# Patient Record
Sex: FEMALE | Race: WHITE | NOT HISPANIC OR LATINO | Employment: OTHER | URBAN - METROPOLITAN AREA
[De-identification: names, ages, dates, MRNs, and addresses within clinical notes are randomized per-mention and may not be internally consistent; named-entity substitution may affect disease eponyms.]

---

## 2018-07-18 ENCOUNTER — APPOINTMENT (OUTPATIENT)
Dept: RADIOLOGY | Facility: MEDICAL CENTER | Age: 83
DRG: 038 | End: 2018-07-18
Attending: HOSPITALIST
Payer: MEDICARE

## 2018-07-18 ENCOUNTER — APPOINTMENT (OUTPATIENT)
Dept: RADIOLOGY | Facility: MEDICAL CENTER | Age: 83
DRG: 038 | End: 2018-07-18
Attending: EMERGENCY MEDICINE
Payer: MEDICARE

## 2018-07-18 ENCOUNTER — HOSPITAL ENCOUNTER (INPATIENT)
Facility: MEDICAL CENTER | Age: 83
LOS: 8 days | DRG: 038 | End: 2018-07-26
Attending: EMERGENCY MEDICINE | Admitting: HOSPITALIST
Payer: MEDICARE

## 2018-07-18 DIAGNOSIS — I63.9 CEREBROVASCULAR ACCIDENT (CVA), UNSPECIFIED MECHANISM (HCC): ICD-10-CM

## 2018-07-18 PROBLEM — K21.9 GASTROESOPHAGEAL REFLUX DISEASE: Status: ACTIVE | Noted: 2018-07-18

## 2018-07-18 PROBLEM — R79.89 RENAL AZOTEMIA: Status: ACTIVE | Noted: 2018-07-18

## 2018-07-18 PROBLEM — I10 HYPERTENSION: Status: ACTIVE | Noted: 2018-07-18

## 2018-07-18 PROBLEM — R73.9 HYPERGLYCEMIA: Status: ACTIVE | Noted: 2018-07-18

## 2018-07-18 PROBLEM — E83.42 HYPOMAGNESEMIA: Status: ACTIVE | Noted: 2018-07-18

## 2018-07-18 PROBLEM — R32 URINARY INCONTINENCE: Status: ACTIVE | Noted: 2018-07-18

## 2018-07-18 LAB
ABO GROUP BLD: NORMAL
ALBUMIN SERPL BCP-MCNC: 4.2 G/DL (ref 3.2–4.9)
ALBUMIN/GLOB SERPL: 1.4 G/DL
ALP SERPL-CCNC: 44 U/L (ref 30–99)
ALT SERPL-CCNC: 13 U/L (ref 2–50)
ANION GAP SERPL CALC-SCNC: 11 MMOL/L (ref 0–11.9)
APTT PPP: 24.3 SEC (ref 24.7–36)
AST SERPL-CCNC: 22 U/L (ref 12–45)
BASOPHILS # BLD AUTO: 0.7 % (ref 0–1.8)
BASOPHILS # BLD: 0.06 K/UL (ref 0–0.12)
BILIRUB SERPL-MCNC: 0.6 MG/DL (ref 0.1–1.5)
BLD GP AB SCN SERPL QL: NORMAL
BUN SERPL-MCNC: 21 MG/DL (ref 8–22)
CALCIUM SERPL-MCNC: 10.1 MG/DL (ref 8.5–10.5)
CHLORIDE SERPL-SCNC: 98 MMOL/L (ref 96–112)
CO2 SERPL-SCNC: 27 MMOL/L (ref 20–33)
CREAT SERPL-MCNC: 1.07 MG/DL (ref 0.5–1.4)
EOSINOPHIL # BLD AUTO: 0.06 K/UL (ref 0–0.51)
EOSINOPHIL NFR BLD: 0.7 % (ref 0–6.9)
ERYTHROCYTE [DISTWIDTH] IN BLOOD BY AUTOMATED COUNT: 47.4 FL (ref 35.9–50)
EST. AVERAGE GLUCOSE BLD GHB EST-MCNC: 120 MG/DL
GLOBULIN SER CALC-MCNC: 3 G/DL (ref 1.9–3.5)
GLUCOSE BLD-MCNC: 187 MG/DL (ref 65–99)
GLUCOSE SERPL-MCNC: 177 MG/DL (ref 65–99)
HBA1C MFR BLD: 5.8 % (ref 0–5.6)
HCT VFR BLD AUTO: 36.3 % (ref 37–47)
HGB BLD-MCNC: 12.5 G/DL (ref 12–16)
IMM GRANULOCYTES # BLD AUTO: 0.06 K/UL (ref 0–0.11)
IMM GRANULOCYTES NFR BLD AUTO: 0.7 % (ref 0–0.9)
INR PPP: 1.06 (ref 0.87–1.13)
LYMPHOCYTES # BLD AUTO: 0.92 K/UL (ref 1–4.8)
LYMPHOCYTES NFR BLD: 10.5 % (ref 22–41)
MAGNESIUM SERPL-MCNC: 1.3 MG/DL (ref 1.5–2.5)
MCH RBC QN AUTO: 31.5 PG (ref 27–33)
MCHC RBC AUTO-ENTMCNC: 34.4 G/DL (ref 33.6–35)
MCV RBC AUTO: 91.4 FL (ref 81.4–97.8)
MONOCYTES # BLD AUTO: 0.36 K/UL (ref 0–0.85)
MONOCYTES NFR BLD AUTO: 4.1 % (ref 0–13.4)
NEUTROPHILS # BLD AUTO: 7.34 K/UL (ref 2–7.15)
NEUTROPHILS NFR BLD: 83.3 % (ref 44–72)
NRBC # BLD AUTO: 0 K/UL
NRBC BLD-RTO: 0 /100 WBC
PLATELET # BLD AUTO: 280 K/UL (ref 164–446)
PMV BLD AUTO: 9.7 FL (ref 9–12.9)
POTASSIUM SERPL-SCNC: 4.2 MMOL/L (ref 3.6–5.5)
PROT SERPL-MCNC: 7.2 G/DL (ref 6–8.2)
PROTHROMBIN TIME: 13.5 SEC (ref 12–14.6)
RBC # BLD AUTO: 3.97 M/UL (ref 4.2–5.4)
RH BLD: NORMAL
SODIUM SERPL-SCNC: 136 MMOL/L (ref 135–145)
TROPONIN I SERPL-MCNC: <0.01 NG/ML (ref 0–0.04)
TSH SERPL DL<=0.005 MIU/L-ACNC: 3.17 UIU/ML (ref 0.38–5.33)
WBC # BLD AUTO: 8.8 K/UL (ref 4.8–10.8)

## 2018-07-18 PROCEDURE — 700111 HCHG RX REV CODE 636 W/ 250 OVERRIDE (IP): Performed by: HOSPITALIST

## 2018-07-18 PROCEDURE — 0042T CT-CEREBRAL PERFUSION ANALYSIS: CPT

## 2018-07-18 PROCEDURE — 700101 HCHG RX REV CODE 250: Performed by: HOSPITALIST

## 2018-07-18 PROCEDURE — 700117 HCHG RX CONTRAST REV CODE 255: Performed by: EMERGENCY MEDICINE

## 2018-07-18 PROCEDURE — 70551 MRI BRAIN STEM W/O DYE: CPT

## 2018-07-18 PROCEDURE — 700105 HCHG RX REV CODE 258: Performed by: HOSPITALIST

## 2018-07-18 PROCEDURE — 82962 GLUCOSE BLOOD TEST: CPT

## 2018-07-18 PROCEDURE — 80053 COMPREHEN METABOLIC PANEL: CPT

## 2018-07-18 PROCEDURE — 84484 ASSAY OF TROPONIN QUANT: CPT

## 2018-07-18 PROCEDURE — 86901 BLOOD TYPING SEROLOGIC RH(D): CPT

## 2018-07-18 PROCEDURE — 86900 BLOOD TYPING SEROLOGIC ABO: CPT

## 2018-07-18 PROCEDURE — 94760 N-INVAS EAR/PLS OXIMETRY 1: CPT

## 2018-07-18 PROCEDURE — 83735 ASSAY OF MAGNESIUM: CPT

## 2018-07-18 PROCEDURE — 99291 CRITICAL CARE FIRST HOUR: CPT

## 2018-07-18 PROCEDURE — 85025 COMPLETE CBC W/AUTO DIFF WBC: CPT

## 2018-07-18 PROCEDURE — 84443 ASSAY THYROID STIM HORMONE: CPT

## 2018-07-18 PROCEDURE — 770022 HCHG ROOM/CARE - ICU (200)

## 2018-07-18 PROCEDURE — 37212 THROMBOLYTIC VENOUS THERAPY: CPT

## 2018-07-18 PROCEDURE — 86850 RBC ANTIBODY SCREEN: CPT

## 2018-07-18 PROCEDURE — 70450 CT HEAD/BRAIN W/O DYE: CPT

## 2018-07-18 PROCEDURE — 70496 CT ANGIOGRAPHY HEAD: CPT

## 2018-07-18 PROCEDURE — 700105 HCHG RX REV CODE 258: Performed by: PSYCHIATRY & NEUROLOGY

## 2018-07-18 PROCEDURE — 70498 CT ANGIOGRAPHY NECK: CPT

## 2018-07-18 PROCEDURE — 85610 PROTHROMBIN TIME: CPT

## 2018-07-18 PROCEDURE — 83036 HEMOGLOBIN GLYCOSYLATED A1C: CPT

## 2018-07-18 PROCEDURE — 85730 THROMBOPLASTIN TIME PARTIAL: CPT

## 2018-07-18 PROCEDURE — 74018 RADEX ABDOMEN 1 VIEW: CPT

## 2018-07-18 PROCEDURE — 71045 X-RAY EXAM CHEST 1 VIEW: CPT

## 2018-07-18 PROCEDURE — 99233 SBSQ HOSP IP/OBS HIGH 50: CPT | Performed by: HOSPITALIST

## 2018-07-18 PROCEDURE — 700111 HCHG RX REV CODE 636 W/ 250 OVERRIDE (IP): Performed by: PSYCHIATRY & NEUROLOGY

## 2018-07-18 RX ORDER — MAGNESIUM SULFATE HEPTAHYDRATE 40 MG/ML
4 INJECTION, SOLUTION INTRAVENOUS ONCE
Status: COMPLETED | OUTPATIENT
Start: 2018-07-18 | End: 2018-07-19

## 2018-07-18 RX ORDER — HYDRALAZINE HYDROCHLORIDE 20 MG/ML
10 INJECTION INTRAMUSCULAR; INTRAVENOUS
Status: DISCONTINUED | OUTPATIENT
Start: 2018-07-18 | End: 2018-07-18

## 2018-07-18 RX ORDER — SODIUM CHLORIDE 9 MG/ML
INJECTION, SOLUTION INTRAVENOUS CONTINUOUS
Status: DISCONTINUED | OUTPATIENT
Start: 2018-07-18 | End: 2018-07-26 | Stop reason: HOSPADM

## 2018-07-18 RX ORDER — ONDANSETRON 4 MG/1
4 TABLET, ORALLY DISINTEGRATING ORAL EVERY 4 HOURS PRN
Status: DISCONTINUED | OUTPATIENT
Start: 2018-07-18 | End: 2018-07-26 | Stop reason: HOSPADM

## 2018-07-18 RX ORDER — ACETAMINOPHEN 325 MG/1
650 TABLET ORAL EVERY 6 HOURS PRN
Status: DISCONTINUED | OUTPATIENT
Start: 2018-07-18 | End: 2018-07-26 | Stop reason: HOSPADM

## 2018-07-18 RX ORDER — BISACODYL 10 MG
10 SUPPOSITORY, RECTAL RECTAL
Status: DISCONTINUED | OUTPATIENT
Start: 2018-07-18 | End: 2018-07-26 | Stop reason: HOSPADM

## 2018-07-18 RX ORDER — ONDANSETRON 2 MG/ML
4 INJECTION INTRAMUSCULAR; INTRAVENOUS EVERY 4 HOURS PRN
Status: DISCONTINUED | OUTPATIENT
Start: 2018-07-18 | End: 2018-07-22

## 2018-07-18 RX ORDER — ASPIRIN 325 MG
325 TABLET ORAL DAILY
Status: DISCONTINUED | OUTPATIENT
Start: 2018-07-19 | End: 2018-07-20

## 2018-07-18 RX ORDER — LOSARTAN POTASSIUM 25 MG/1
25 TABLET ORAL DAILY
Status: ON HOLD | COMMUNITY
End: 2018-07-25

## 2018-07-18 RX ORDER — LABETALOL HYDROCHLORIDE 5 MG/ML
10 INJECTION, SOLUTION INTRAVENOUS EVERY 4 HOURS PRN
Status: DISCONTINUED | OUTPATIENT
Start: 2018-07-18 | End: 2018-07-25

## 2018-07-18 RX ORDER — ATORVASTATIN CALCIUM 40 MG/1
40 TABLET, FILM COATED ORAL EVERY EVENING
Status: DISCONTINUED | OUTPATIENT
Start: 2018-07-18 | End: 2018-07-26 | Stop reason: HOSPADM

## 2018-07-18 RX ORDER — ASPIRIN 81 MG/1
324 TABLET, CHEWABLE ORAL DAILY
Status: DISCONTINUED | OUTPATIENT
Start: 2018-07-19 | End: 2018-07-20

## 2018-07-18 RX ORDER — SODIUM CHLORIDE 9 MG/ML
50 INJECTION, SOLUTION INTRAVENOUS ONCE
Status: COMPLETED | OUTPATIENT
Start: 2018-07-18 | End: 2018-07-18

## 2018-07-18 RX ORDER — AMOXICILLIN 250 MG
2 CAPSULE ORAL 2 TIMES DAILY
Status: DISCONTINUED | OUTPATIENT
Start: 2018-07-18 | End: 2018-07-26 | Stop reason: HOSPADM

## 2018-07-18 RX ORDER — POLYETHYLENE GLYCOL 3350 17 G/17G
1 POWDER, FOR SOLUTION ORAL
Status: DISCONTINUED | OUTPATIENT
Start: 2018-07-18 | End: 2018-07-26 | Stop reason: HOSPADM

## 2018-07-18 RX ORDER — ASPIRIN 300 MG/1
300 SUPPOSITORY RECTAL DAILY
Status: DISCONTINUED | OUTPATIENT
Start: 2018-07-19 | End: 2018-07-20

## 2018-07-18 RX ADMIN — IOHEXOL 50 ML: 350 INJECTION, SOLUTION INTRAVENOUS at 15:30

## 2018-07-18 RX ADMIN — ALTEPLASE 42.3 MG: KIT at 15:46

## 2018-07-18 RX ADMIN — SODIUM CHLORIDE 50 ML: 9 INJECTION, SOLUTION INTRAVENOUS at 16:44

## 2018-07-18 RX ADMIN — SODIUM CHLORIDE: 9 INJECTION, SOLUTION INTRAVENOUS at 17:56

## 2018-07-18 RX ADMIN — IOHEXOL 100 ML: 350 INJECTION, SOLUTION INTRAVENOUS at 15:16

## 2018-07-18 RX ADMIN — MAGNESIUM SULFATE IN WATER 4 G: 40 INJECTION, SOLUTION INTRAVENOUS at 23:17

## 2018-07-18 RX ADMIN — LABETALOL HYDROCHLORIDE 10 MG: 5 INJECTION, SOLUTION INTRAVENOUS at 19:28

## 2018-07-18 ASSESSMENT — COGNITIVE AND FUNCTIONAL STATUS - GENERAL
DRESSING REGULAR LOWER BODY CLOTHING: A LITTLE
DRESSING REGULAR UPPER BODY CLOTHING: A LITTLE
PERSONAL GROOMING: A LITTLE
WALKING IN HOSPITAL ROOM: A LOT
CLIMB 3 TO 5 STEPS WITH RAILING: A LOT
MOVING TO AND FROM BED TO CHAIR: A LITTLE
SUGGESTED CMS G CODE MODIFIER MOBILITY: CK
STANDING UP FROM CHAIR USING ARMS: A LITTLE
SUGGESTED CMS G CODE MODIFIER DAILY ACTIVITY: CK
TOILETING: A LITTLE
MOBILITY SCORE: 16
DAILY ACTIVITIY SCORE: 18
EATING MEALS: A LITTLE
TURNING FROM BACK TO SIDE WHILE IN FLAT BAD: A LITTLE
MOVING FROM LYING ON BACK TO SITTING ON SIDE OF FLAT BED: A LITTLE
HELP NEEDED FOR BATHING: A LITTLE

## 2018-07-18 ASSESSMENT — PATIENT HEALTH QUESTIONNAIRE - PHQ9
SUM OF ALL RESPONSES TO PHQ9 QUESTIONS 1 AND 2: 0
SUM OF ALL RESPONSES TO PHQ9 QUESTIONS 1 AND 2: 0
2. FEELING DOWN, DEPRESSED, IRRITABLE, OR HOPELESS: NOT AT ALL
1. LITTLE INTEREST OR PLEASURE IN DOING THINGS: NOT AT ALL
2. FEELING DOWN, DEPRESSED, IRRITABLE, OR HOPELESS: NOT AT ALL
1. LITTLE INTEREST OR PLEASURE IN DOING THINGS: NOT AT ALL

## 2018-07-18 ASSESSMENT — LIFESTYLE VARIABLES
HAVE YOU EVER FELT YOU SHOULD CUT DOWN ON YOUR DRINKING: YES
ALCOHOL_USE: YES
HOW MANY TIMES IN THE PAST YEAR HAVE YOU HAD 5 OR MORE DRINKS IN A DAY: 365
TOTAL SCORE: 4
CONSUMPTION TOTAL: POSITIVE
TOTAL SCORE: 4
TOTAL SCORE: 4
EVER HAD A DRINK FIRST THING IN THE MORNING TO STEADY YOUR NERVES TO GET RID OF A HANGOVER: YES
DOES PATIENT WANT TO STOP DRINKING: NO
EVER FELT BAD OR GUILTY ABOUT YOUR DRINKING: YES
AVERAGE NUMBER OF DAYS PER WEEK YOU HAVE A DRINK CONTAINING ALCOHOL: 7
HAVE PEOPLE ANNOYED YOU BY CRITICIZING YOUR DRINKING: YES
ON A TYPICAL DAY WHEN YOU DRINK ALCOHOL HOW MANY DRINKS DO YOU HAVE: 5
EVER_SMOKED: YES

## 2018-07-18 ASSESSMENT — PAIN SCALES - GENERAL
PAINLEVEL_OUTOF10: 0

## 2018-07-18 ASSESSMENT — COPD QUESTIONNAIRES
DURING THE PAST 4 WEEKS HOW MUCH DID YOU FEEL SHORT OF BREATH: NONE/LITTLE OF THE TIME
HAVE YOU SMOKED AT LEAST 100 CIGARETTES IN YOUR ENTIRE LIFE: YES
DO YOU EVER COUGH UP ANY MUCUS OR PHLEGM?: NO/ONLY WITH OCCASIONAL COLDS OR INFECTIONS
COPD SCREENING SCORE: 4
IN THE PAST 12 MONTHS DO YOU DO LESS THAN YOU USED TO BECAUSE OF YOUR BREATHING PROBLEMS: DISAGREE/UNSURE

## 2018-07-18 NOTE — ED PROVIDER NOTES
ED Provider Note    Scribed for Blanca Hardin M.D. by Peggy Thorne. 7/18/2018, 3:01 PM.    Means of arrival: med flight   History obtained from: patient and EMS  History limited by: patient's altered level of consciousness    CHIEF COMPLAINT  Chief Complaint   Patient presents with   • Possible Stroke       HPI  Maddy Bobo is an approximately 60s-70sy.o. female who presents to the Emergency Department for evaluation of a possible stroke one hour prior to arrival. Per EMS the patient was out and about with friends when she suddenly began to experience associated right sided facial droop, weakness, and an inability to speak. Patient was given 4 mg of Zofran en route. No complaints of nausea on exam.     Further HPI is limited secondary to the patient's altered level of consciousness.     REVIEW OF SYSTEMS  Pertinent positives include right sided facial droop, weakness, and an inability to speak. Pertinent negatives include nausea. C.    Further ROS is limited secondary to the patient's altered level of consciousness.     PAST MEDICAL HISTORY   No pertinent past medical history    SURGICAL HISTORY  patient denies any surgical history    SOCIAL HISTORY  Social History   Substance Use Topics   • Smoking status: None noted   • Smokeless tobacco: None noted    • Alcohol use None noted       History   Drug use: Unknown       FAMILY HISTORY  No family history noted    CURRENT MEDICATIONS  Current medications can be reviewed in the nurse's note.     ALLERGIES  No known drug allergies    PHYSICAL EXAM  VITAL SIGNS: /83   Pulse 94   Resp 15   Wt 52.2 kg (115 lb)   SpO2 96%   Constitutional: Alert, but slow to respond to commands in no apparent distress.  HENT: No signs of trauma, Bilateral external ears normal, Nose normal.   Eyes: Pupils are equal and reactive, Conjunctiva normal, Non-icteric.   Neck: Normal range of motion, No tenderness, Supple, No stridor.   Cardiovascular: Regular rate and rhythm, no murmurs.    Thorax & Lungs: Normal breath sounds, No respiratory distress, No wheezing, No chest tenderness.   Abdomen: Bowel sounds normal, Soft, No tenderness, No masses, No peritoneal signs.  Skin: Warm, Dry, No erythema, No rash.   Musculoskeletal:  No major deformities noted.   Neurologic: Alert, but slow to respond to commands, able to follow some commands, right sided facial droop, right arm has flaccid paralysis with decreased sensation, spontaneously moving her right lower extremity with gravity, intact left side. NIH score is 13.       LABS  Labs Reviewed   CBC WITH DIFFERENTIAL - Abnormal; Notable for the following:        Result Value    RBC 3.97 (*)     Hematocrit 36.3 (*)     Neutrophils-Polys 83.30 (*)     Lymphocytes 10.50 (*)     Neutrophils (Absolute) 7.34 (*)     Lymphs (Absolute) 0.92 (*)     All other components within normal limits    Narrative:     Indicate which anticoagulants the patient is on:->UNKNOWN   COMP METABOLIC PANEL - Abnormal; Notable for the following:     Glucose 177 (*)     All other components within normal limits    Narrative:     Indicate which anticoagulants the patient is on:->UNKNOWN   APTT - Abnormal; Notable for the following:     APTT 24.3 (*)     All other components within normal limits    Narrative:     Indicate which anticoagulants the patient is on:->UNKNOWN   ESTIMATED GFR - Abnormal; Notable for the following:     GFR If  55 (*)     GFR If Non  46 (*)     All other components within normal limits    Narrative:     Indicate which anticoagulants the patient is on:->UNKNOWN   MAGNESIUM - Abnormal; Notable for the following:     Magnesium 1.3 (*)     All other components within normal limits   ACCU-CHEK GLUCOSE - Abnormal; Notable for the following:     Glucose - Accu-Ck 187 (*)     All other components within normal limits   PROTHROMBIN TIME    Narrative:     Indicate which anticoagulants the patient is on:->UNKNOWN   COD (ADULT)   TROPONIN     Narrative:     Indicate which anticoagulants the patient is on:->UNKNOWN   HEMOGLOBIN A1C   TSH   URINALYSIS,CULTURE IF INDICATED   ABO AND RH CONFIRMATION   URINE DRUG SCREEN     All labs reviewed by me.    EKG  EKG was not performed prior to the patient's admission.     RADIOLOGY  [id number] VIEW   Final Result      1.  No radiopaque foreign body to preclude MRI evaluation.      2.  Bladder diverticulum.      CT-CEREBRAL PERFUSION ANALYSIS   Final Result      1.  CT perfusion examination over the limited section of brain reveals 0 mL of brain parenchyma has less than 30% of cerebral blood flow (CBF).      2.  Please note that the cerebral perfusion was performed on the limited brain tissue around the basal ganglia region. Infarct/ischemia outside the CT perfusion sections can be missed in this study.      DX-CHEST-PORTABLE (1 VIEW)   Final Result      Interstitial lung disease.      CT-CTA HEAD WITH & W/O-POST PROCESS   Final Result      CT angiogram of the [place] within normal limits.      CT-CTA NECK WITH & W/O-POST PROCESSING   Final Result      Atherosclerotic disease without evidence of occlusion or significant stenosis.      CT-HEAD W/O   Final Result      1.  Cerebral atrophy.      2.  White matter lucencies most consistent with small vessel ischemic change versus demyelination or gliosis.      3.  Otherwise, Head CT without contrast with no acute findings. No evidence of acute cerebral infarction, hemorrhage or mass lesion.      MR-BRAIN-W/O    (Results Pending)   Cartoid Duplex ([facility] [place] [facility] Only) - Do not order if CTA - Neck done in ER    (Results Pending)   Echocardiogram Comp W/O cont    (Results Pending)     The radiologist's interpretation of all radiological studies have been reviewed by me.    COURSE & MEDICAL DECISION MAKING  Pertinent Labs & Imaging studies reviewed. (See chart for details)    Differential diagnoses include but are not limited to: head bleed vs stroke      3:01 PM - Patient seen and examined at the charge desk for a stroke rule out. Ordered DX chest, CT head, CT cerebral perfusion analysis, CT CTA head, CT CTA neck, CBC, CMP, PTT, APTT, COD, troponin, urinalysis culture, abo and rh confirmation, EKG to evaluate her symptoms.     3:05 PM- Patient was sent to CT.    3:16 PM- Reviewed the patient's lab and imaging results.     3:22 PM- Patient was reevaluated. Discussed her CT results with her and Dr. Kirkpatrick (neurology) and discussed TPA administration. Patient was counseled on the risks and benefits of doing so and she consented to have TPA administered. She understands she will be admitted to the ICU for further care.     3:32 PM- I discussed the patient's case and the above findings with Dr. Hamilton (hospitalist) who accepts the patient for admission to the ICU.     4:50 PM- Patient was reevaluated at bedside. Her alteplase infusion is complete. She is awaiting transfer to the ICU.    Decision Making:  This is a 60-70s y.o. year old female who presents with acute onset of right-sided weakness and aphasia.  Her symptoms on presentation were concerning for acute CVA she was sent to the CT scanner.  CT scan did not show any evidence of intracranial bleed.  She was a candidate for alteplase and therefore this was discussed with the patient she was agreeable and it was given.  On reassessment she had no significant improvement of her symptoms she was still very aphasic and frustrated with not being able to speak clearly.  Patient will be admitted to the ICU.    DISPOSITION:  Patient will be admitted to Dr. Hamilton (hospitalist) in critical condition.    CRITICAL CARE  The very real possibilty of a deterioration of this patient's condition required the highest level of my preparedness for sudden, emergent intervention.  I provided critical care services, which included medication orders, frequent reevaluations of the patient's condition and response to treatment, ordering  and reviewing test results, and discussing the case with various consultants.  The critical care time associated with the care of the patient was 30 minutes. Review chart for interventions. This time is exclusive of any other billable procedures.     FINAL IMPRESSION  1. Cerebrovascular accident (CVA), unspecified mechanism (HCC)    Critical care time of 30 minutes.     This dictation has been created using voice recognition software and/or scribes. The accuracy of the dictation is limited by the abilities of the software and the expertise of the scribes. I expect there may be some errors of grammar and possibly content. I made every attempt to manually correct the errors within my dictation. However, errors related to voice recognition software and/or scribes may still exist and should be interpreted within the appropriate context.     I, Peggy Thorne (Scribe), am scribing for, and in the presence of, Blanca Hardin M.D..    Electronically signed by: Peggy Thorne (Scribe), 7/18/2018    IBlanca M.D. personally performed the services described in this documentation, as scribed by Peggy Thorne in my presence, and it is both accurate and complete.    The note accurately reflects work and decisions made by me.  Blanca Hardin  7/18/2018  9:35 PM

## 2018-07-18 NOTE — H&P
Hospital Medicine History & Physical Note    Date of Service  7/18/2018    Primary Care Physician  No primary care provider on file.    Consultants  Neurology    Code Status  Full code    Chief Complaint  Unable to speak    History of Presenting Illness  89 y.o. female who presented 7/18/2018 with acute aphasia suggesting a stroke. She is weaker on the right side as well. And she also has a right-sided oral droop. The patient at this point is in the window for TPA and is actively receiving TPA. Patient will be admitted to the ICU after TPA under TPA protocol.    Review of Systems  Review of Systems   Unable to perform ROS: Acuity of condition       Past Medical History   has a past medical history of Cataract; Fall; Gynecological disorder; Hypertension; Indigestion; and Urinary incontinence.    Surgical History   has a past surgical history that includes gyn surgery.     Family History  family history is not on file.     Social History   reports that she quit smoking about 18 years ago. Her smoking use included Cigarettes. She started smoking about 68 years ago. She has a 50.00 pack-year smoking history. She has never used smokeless tobacco. She reports that she drinks alcohol. She reports that she does not use drugs.    Allergies  No Known Allergies    Medications  None       Physical Exam  BP: 154/83       Pulse: 94   Resp: 15   SpO2: 96 %     Physical Exam   Constitutional: She appears well-developed and well-nourished.   HENT:   Head: Normocephalic and atraumatic.   Right Ear: External ear normal.   Left Ear: External ear normal.   Nose: Nose normal.   Mouth/Throat: Oropharynx is clear and moist.   Eyes: Conjunctivae and EOM are normal. Pupils are equal, round, and reactive to light.   Neck: Normal range of motion. Neck supple. No JVD present. No thyromegaly present.   Cardiovascular: Normal rate and regular rhythm.    No murmur heard.  Pulmonary/Chest: Effort normal and breath sounds normal. She has no  wheezes. She has no rales. She exhibits no tenderness.   Abdominal: Soft. Bowel sounds are normal. She exhibits no distension and no mass. There is no tenderness. There is no rebound and no guarding.   Musculoskeletal: Normal range of motion. She exhibits no edema or tenderness.   Lymphadenopathy:     She has no cervical adenopathy.   Neurological: She is alert. She has normal reflexes. She is disoriented. No cranial nerve deficit. She exhibits normal muscle tone. Coordination normal. GCS eye subscore is 4. GCS verbal subscore is 1. GCS motor subscore is 6.   Skin: Skin is warm and dry. No rash noted. No erythema. No pallor.   Psychiatric: She has a normal mood and affect. She is slowed. Cognition and memory are impaired. She is noncommunicative.   Nursing note and vitals reviewed.      Laboratory:  Recent Labs      07/18/18   1510   WBC  8.8   RBC  3.97*   HEMOGLOBIN  12.5   HEMATOCRIT  36.3*   MCV  91.4   MCH  31.5   MCHC  34.4   RDW  47.4   PLATELETCT  280   MPV  9.7     Recent Labs      07/18/18   1510   SODIUM  136   POTASSIUM  4.2   CHLORIDE  98   CO2  27   GLUCOSE  177*   BUN  21   CREATININE  1.07   CALCIUM  10.1     Recent Labs      07/18/18   1510   ALTSGPT  13   ASTSGOT  22   ALKPHOSPHAT  44   TBILIRUBIN  0.6   GLUCOSE  177*     Recent Labs      07/18/18   1510   APTT  24.3*   INR  1.06             Lab Results   Component Value Date    TROPONINI <0.01 07/18/2018       Urinalysis:    No results found for: SPECGRAVITY, GLUCOSEUR, KETONES, NITRITE, WBCURINE, RBCURINE, BACTERIA, EPITHELCELL     Imaging:  MR-BRAIN-W/O   Final Result      1.  Moderate cerebral atrophy.   2.  Moderate supratentorial white matter disease most consistent with microvascular ischemic change.   3.  Scattered curvilinear gyral/cortical areas of acute infarction involving the left posterior frontal lobe and parietal lobe over the convexities. This is consistent with acute cerebral infarction in the territory of left MCA sylvian  branches. No    hemorrhagic transformation.      [id number] VIEW   Final Result      1.  No radiopaque foreign body to preclude MRI evaluation.      2.  Bladder diverticulum.      CT-CEREBRAL PERFUSION ANALYSIS   Final Result      1.  CT perfusion examination over the limited section of brain reveals 0 mL of brain parenchyma has less than 30% of cerebral blood flow (CBF).      2.  Please note that the cerebral perfusion was performed on the limited brain tissue around the basal ganglia region. Infarct/ischemia outside the CT perfusion sections can be missed in this study.      DX-CHEST-PORTABLE (1 VIEW)   Final Result      Interstitial lung disease.      CT-CTA HEAD WITH & W/O-POST PROCESS   Final Result      CT angiogram of the [place] within normal limits.      CT-CTA NECK WITH & W/O-POST PROCESSING   Final Result      Atherosclerotic disease without evidence of occlusion or significant stenosis.      CT-HEAD W/O   Final Result      1.  Cerebral atrophy.      2.  White matter lucencies most consistent with small vessel ischemic change versus demyelination or gliosis.      3.  Otherwise, Head CT without contrast with no acute findings. No evidence of acute cerebral infarction, hemorrhage or mass lesion.      Cartoid Duplex ([facility] [place] [facility] Only) - Do not order if CTA - Neck done in ER    (Results Pending)   Echocardiogram Comp W/O cont    (Results Pending)   CT-HEAD W/O    (Results Pending)         Assessment/Plan:  I anticipate this patient will require at least two midnights for appropriate medical management, necessitating inpatient admission.    Acute CVA (cerebrovascular accident) (HCC)   Assessment & Plan    Patient today at 1:30 PM had the sudden onset of slurred speech followed by right-sided oral droop as well as right arm weakness. The patient was brought into the emergency room by ambulance.  The patient at that point was evaluated found to be appropriate for TPA. TPA administration  was begun and at this point patient will be admitted to the ICU for post-TPA follow-up and protocol.  Patient will be evaluated with an MRI of the head as well as placed on aspirin statin, she will also have an echocardiogram and a carotid ultrasound..  Patient will be valid by PTOT and then rehab evaluation will be considered.        Hyperglycemia   Assessment & Plan    Blood sugar at this point elevated at 177. Thus hemoglobin A1c was requested which is only 5.8. I do not see the patient is being diabetic the patient most likely stress response elevated blood sugars. Monitor at this point sugar levels continuously while she is in the hospital.        Renal azotemia   Assessment & Plan    Patient has mild decrease in her GFR down to 46 Will this point give her gentle hydration.        Hypomagnesemia   Assessment & Plan    Patient's magnesium level is extremely low at 1.3 Will supplement at this point magnesium with 4 g of IV magnesium        Urinary incontinence   Assessment & Plan    Chronic at this point and monitor if necessary a [place] catheter will be inserted.        Gastroesophageal reflux disease   Assessment & Plan    Continue at this point with Pepcid or PPI        Hypertension   Assessment & Plan    Currently permissive hypertension will be allowed.            VTE prophylaxis: Sequential

## 2018-07-18 NOTE — ED NOTES
Pt is not able to tell what medications she is taking.  Called Son (Jose) @ [phone] to verify medications, Jose is not sure what medications she is taking or what pharmacy she goes too, he is going to call her Caregiver and call the [phone] phone.  I asked if I could patti lthe caregiver, pts son reports that he will call her caregiver.  Pts son is upset that he has not been called for over 2 hours regarding his mother care.  I informed pts nurse regarding call pts son (Jose).  Pts son reports that pt does not have any allergies that he is aware off.

## 2018-07-18 NOTE — ED TRIAGE NOTES
BIB med flight as possible stroke. Pt vomited on self suddenly around 1315 and developed a RT sided facial droop/weakness and asphasia. On arrival pt able to follow some commands, had improvement of RT sided weakness and was able to say hello. FSBS: 187. CT in process.

## 2018-07-19 ENCOUNTER — HOSPITAL ENCOUNTER (OUTPATIENT)
Dept: RADIOLOGY | Facility: MEDICAL CENTER | Age: 83
End: 2018-07-19
Attending: HOSPITALIST

## 2018-07-19 ENCOUNTER — HOSPITAL ENCOUNTER (INPATIENT)
Facility: REHABILITATION | Age: 83
End: 2018-07-19
Attending: PHYSICAL MEDICINE & REHABILITATION | Admitting: PHYSICAL MEDICINE & REHABILITATION
Payer: MEDICARE

## 2018-07-19 PROBLEM — R32 URINARY INCONTINENCE: Status: RESOLVED | Noted: 2018-07-18 | Resolved: 2018-07-19

## 2018-07-19 PROBLEM — E78.5 DYSLIPIDEMIA: Status: ACTIVE | Noted: 2018-07-19

## 2018-07-19 PROBLEM — K21.9 GASTROESOPHAGEAL REFLUX DISEASE: Status: RESOLVED | Noted: 2018-07-18 | Resolved: 2018-07-19

## 2018-07-19 LAB
ABO GROUP BLD: NORMAL
ANION GAP SERPL CALC-SCNC: 10 MMOL/L (ref 0–11.9)
BUN SERPL-MCNC: 20 MG/DL (ref 8–22)
CALCIUM SERPL-MCNC: 9 MG/DL (ref 8.5–10.5)
CHLORIDE SERPL-SCNC: 102 MMOL/L (ref 96–112)
CHOLEST SERPL-MCNC: 265 MG/DL (ref 100–199)
CO2 SERPL-SCNC: 23 MMOL/L (ref 20–33)
CREAT SERPL-MCNC: 0.7 MG/DL (ref 0.5–1.4)
ERYTHROCYTE [DISTWIDTH] IN BLOOD BY AUTOMATED COUNT: 47.5 FL (ref 35.9–50)
GLUCOSE SERPL-MCNC: 118 MG/DL (ref 65–99)
HCT VFR BLD AUTO: 33.6 % (ref 37–47)
HDLC SERPL-MCNC: 72 MG/DL
HGB BLD-MCNC: 11.7 G/DL (ref 12–16)
LDLC SERPL CALC-MCNC: 166 MG/DL
MAGNESIUM SERPL-MCNC: 3.7 MG/DL (ref 1.5–2.5)
MCH RBC QN AUTO: 31.8 PG (ref 27–33)
MCHC RBC AUTO-ENTMCNC: 34.8 G/DL (ref 33.6–35)
MCV RBC AUTO: 91.3 FL (ref 81.4–97.8)
PLATELET # BLD AUTO: 183 K/UL (ref 164–446)
PMV BLD AUTO: 10.3 FL (ref 9–12.9)
POTASSIUM SERPL-SCNC: 6.4 MMOL/L (ref 3.6–5.5)
RBC # BLD AUTO: 3.68 M/UL (ref 4.2–5.4)
RH BLD: NORMAL
SODIUM SERPL-SCNC: 135 MMOL/L (ref 135–145)
TRIGL SERPL-MCNC: 133 MG/DL (ref 0–149)
WBC # BLD AUTO: 8.4 K/UL (ref 4.8–10.8)

## 2018-07-19 PROCEDURE — G8996 SWALLOW CURRENT STATUS: HCPCS | Mod: CL

## 2018-07-19 PROCEDURE — A9270 NON-COVERED ITEM OR SERVICE: HCPCS | Performed by: HOSPITALIST

## 2018-07-19 PROCEDURE — 770001 HCHG ROOM/CARE - MED/SURG/GYN PRIV*

## 2018-07-19 PROCEDURE — 92610 EVALUATE SWALLOWING FUNCTION: CPT

## 2018-07-19 PROCEDURE — 80048 BASIC METABOLIC PNL TOTAL CA: CPT

## 2018-07-19 PROCEDURE — 85027 COMPLETE CBC AUTOMATED: CPT

## 2018-07-19 PROCEDURE — 99233 SBSQ HOSP IP/OBS HIGH 50: CPT | Performed by: HOSPITALIST

## 2018-07-19 PROCEDURE — 700102 HCHG RX REV CODE 250 W/ 637 OVERRIDE(OP): Performed by: HOSPITALIST

## 2018-07-19 PROCEDURE — 99291 CRITICAL CARE FIRST HOUR: CPT | Performed by: INTERNAL MEDICINE

## 2018-07-19 PROCEDURE — 83735 ASSAY OF MAGNESIUM: CPT

## 2018-07-19 PROCEDURE — 70450 CT HEAD/BRAIN W/O DYE: CPT

## 2018-07-19 PROCEDURE — G8997 SWALLOW GOAL STATUS: HCPCS | Mod: CH

## 2018-07-19 PROCEDURE — 80061 LIPID PANEL: CPT

## 2018-07-19 PROCEDURE — 700101 HCHG RX REV CODE 250: Performed by: HOSPITALIST

## 2018-07-19 RX ADMIN — ASPIRIN 300 MG: 300 SUPPOSITORY RECTAL at 16:45

## 2018-07-19 RX ADMIN — LABETALOL HYDROCHLORIDE 10 MG: 5 INJECTION, SOLUTION INTRAVENOUS at 19:44

## 2018-07-19 ASSESSMENT — PAIN SCALES - GENERAL
PAINLEVEL_OUTOF10: 0

## 2018-07-19 NOTE — ED NOTES
Spoke with daughter, Bonnie, pt's POA. 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 Contact Info on chart. Asked daughter if she would update pt's son, Jose and friend, Clayton as well.

## 2018-07-19 NOTE — PROGRESS NOTES
[facility] Hospitalist Progress Note    Date of Service: 2018    Chief Complaint  89 y.o. female admitted 2018 with right sided weakness    Interval Problem Update  Ms. Bobo is an 87 year old with a past medical history of hypertension that was brought to the ER with stroke like symptoms and right sided weakness and was given alteplase. She has been admitted to the ICU for hourly neurological checks.     Her family notes that she drinks 5-6 vodka alcoholic beverages. RN notes that she has been developing a tremor.   I met with her son and daughter in law at bedside and we discussed her condition and likely need for cortrak and post-acute rehab.  Consultants/Specialty  Neurology  Critical Care. I discussed with Dr. Zee on ICU Hot Rounds    Disposition  neurology        Review of Systems   Unable to perform ROS: Patient nonverbal      Physical Exam  Laboratory/Imaging   Hemodynamics  Temp (24hrs), Av.8 °C (98.2 °F), Min:36.6 °C (97.8 °F), Max:36.9 °C (98.4 °F)   Temp: 36.9 °C (98.4 °F)  Pulse  Av  Min: 71  Max: 107 Heart Rate (Monitored): 70  BP: 160/80, NIBP: 154/75      Respiratory      Resp: 13, SpO2: 95 %             Fluids    Intake/Output Summary (Last 24 hours) at 18 0828  Last data filed at 18 0600   Gross per 24 hour   Intake           937.08 ml   Output             1075 ml   Net          -137.92 ml       Nutrition  Orders Placed This Encounter   Procedures   • Diet NPO     Standing Status:   Standing     Number of Occurrences:   1     Order Specific Question:   Restrict to:     Answer:   Sips with Medications [3]     Physical Exam   Constitutional: No distress.   HENT:   Head: Normocephalic and atraumatic.   Neck: Normal range of motion. Neck supple.   Cardiovascular: Normal rate and regular rhythm.    No murmur heard.  Pulmonary/Chest: Effort normal. No respiratory distress. She has no wheezes.   Abdominal: Soft. She exhibits no distension. There is no tenderness.    Musculoskeletal: She exhibits no edema or tenderness.   Neurological: She is alert.   Speech is unintelligible  Right facial droop.  Poor dexterity of the right hand   Skin: She is not diaphoretic.   Nursing note and vitals reviewed.      Recent Labs      07/18/18   1510  07/19/18   0439   WBC  8.8  8.4   RBC  3.97*  3.68*   HEMOGLOBIN  12.5  11.7*   HEMATOCRIT  36.3*  33.6*   MCV  91.4  91.3   MCH  31.5  31.8   MCHC  34.4  34.8   RDW  47.4  47.5   PLATELETCT  280  183   MPV  9.7  10.3     Recent Labs      07/18/18   1510  07/19/18   0439   SODIUM  136  135   POTASSIUM  4.2  6.4*   CHLORIDE  98  102   CO2  27  23   GLUCOSE  177*  118*   BUN  21  20   CREATININE  1.07  0.70   CALCIUM  10.1  9.0     Recent Labs      07/18/18   1510   APTT  24.3*   INR  1.06         Recent Labs      07/19/18   0439   TRIGLYCERIDE  133   HDL  72   LDL  166*          Assessment/Plan     Acute CVA (cerebrovascular accident) (HCC)   Assessment & Plan    s/p alteplase on 7/18 with admission to the ICU for neuro-checks hourly.   Neurology consulted.   Echocardiogram pendng  Statin, ASA after 24 hours, SCDs for DVT prophylaxis.  She may require post-acute SNF or rehab.  Monitor on telemetry.    MRI:  1.  Moderate cerebral atrophy.  2.  Moderate supratentorial white matter disease most consistent with microvascular ischemic change.  3.  Scattered curvilinear gyral/cortical areas of acute infarction involving the left posterior frontal lobe and parietal lobe over the convexities. This is consistent with acute cerebral infarction in the territory of left MCA sylvian branches. No   hemorrhagic transformation.        Hyperglycemia   Assessment & Plan    Admit blood sugar as elevated at 177.   Hemoglobin A1c  5.8.        Renal azotemia   Assessment & Plan    Patient has mild decrease in her GFR down to 46 Will this point give her gentle hydration.        Hypomagnesemia   Assessment & Plan    Patient's magnesium level is extremely low at 1.3 Will  supplement at this point magnesium with 4 g of IV magnesium        Dyslipidemia- (present on admission)   Assessment & Plan    . High intensity statin        Hypertension   Assessment & Plan    Allow permissive hypertension in the acute stroke setting          Quality-Core Measures   Reviewed items::  Medications reviewed, Labs reviewed and Radiology images reviewed  DVT prophylaxis pharmacological::  Enoxaparin (Lovenox)  Assessed for rehabilitation services:  Patient was assess for and/or received rehabilitation services during this hospitalization

## 2018-07-19 NOTE — ASSESSMENT & PLAN NOTE
s/p tpa on 7/18. With SBP ~150, goal is <140  - labetalol IV PRN  - Restart home cozaar once cortrak placed

## 2018-07-19 NOTE — THERAPY
"Speech Language Therapy Clinical Swallow Evaluation completed.    Functional Status: Pt was seen for a clinical swallow evaluation on this date. She was AAOx4 and was agreeable to CSE. R facial droop noted upon entering. Pt noted to be aphasic and dysarthric at this time, however is able to generate single words to answer orientation questions. Pt was more successful answering yes/no questions when given several choices for answers. She completed an oral [facility] exam with moderate gross motor deficits noted. She consumed PO trials of thins, NTL, purees, and soft solids during evaluation. Inconsistent s/sx of aspiration were noted with thins, NTL, and mixed consistencies. Moderately prolonged mastication was noted with soft solids. Upon palpation, pt's initiation of swallow was minimally delayed and laryngeal elevation was weak, however complete. Pt required 2 swallows to clear each bolus of soft solids and purees. Recommend that the pt remain NPO at this time. Pt okay to have 4-5 SINGLE ice chips per hour from nursing only. Recommend that a FEES be completed once the 24-hour TPA timeframe has passed (1546 today). FEES to be completed this afternoon/tomorrow morning. Thank you for the consult.    Recommendations - Diet: Diet / Liquid Recommendation: NPO, Pre-Feeding Trials with SLP Only                          Strategies: to be assessed                          Medication Administration: Medication Administration : per MD    Plan of Care: Will benefit from Speech Therapy 5 times per week  Post-Acute Therapy: Discharge to a transitional care facility for continued skilled therapy services.    See \"Rehab Therapy-Acute\" Patient Summary Report for complete documentation.     "

## 2018-07-19 NOTE — ASSESSMENT & PLAN NOTE
Magnesium was low at 1.3, now normalized  - monitor and replete  - start oral supplementation when able

## 2018-07-19 NOTE — DISCHARGE PLANNING
PMR referral from Dr. Hamilton    Scattered curvilinear gyral/cortical areas of acute infarction involving the left posterior frontal lobe and parietal lobe over the convexities. This is consistent with acute cerebral infarction in the territory of left MCA sylvian branches. Ongoing medical management as well as anticipated therapy need. 6 clicks for mobility and self care 16/18. Dr. Rodriguez to consult per protocol. No provider identified for post acute services.

## 2018-07-19 NOTE — CARE PLAN
Problem: Fluid Volume:  Goal: Will maintain balanced intake and output  Outcome: PROGRESSING AS EXPECTED  Monitor I & O

## 2018-07-19 NOTE — ED NOTES
Pt was trying to speak; encouraged pt to motion; she tilted her hand up like she wanted something to drink. Re-explained diagnosis and restrictions; provided mouth swab.

## 2018-07-19 NOTE — ED NOTES
Patient's friend wants us to give contact info to patient's daughter upon arrival:  Alayna Montano [phone]

## 2018-07-19 NOTE — PROGRESS NOTES
Neurology Progress Note        Subjective:  Maddy did well overnight.  She denies any headache.  She is able to get some words out but not many, although she wasn't able to speak at all yesterday.  Additional history has been obtained that she is a daily drinker.    Objective:  Vitals:  Vitals:    07/19/18 1300 07/19/18 1330 07/19/18 1400 07/19/18 1430   BP:       Pulse: 80 78 76 77   Resp: (!) 35 19 19 20   Temp:       SpO2: 94% 94% 96% 92%   Weight:       Height:         General:  Awake, alert and in no apparent distress, cooperative with exam  Mental Status:  Alert, oriented to person and place, she gets one comprehensible word out from time to time, she was able to say her first name, comprehension is intact.  Cranial Nerves:  PERRL, EOMI with no nystagmus, right facial droop, facial sensation is intact.  No dysarthria.  Motor: 5/5 throughout, no drift  Coordination:  Normal finger to nose bilaterally  Gait:  Deferred    Recent Labs      07/18/18   1510  07/19/18   0439   WBC  8.8  8.4   RBC  3.97*  3.68*   HEMOGLOBIN  12.5  11.7*   HEMATOCRIT  36.3*  33.6*   MCV  91.4  91.3   MCH  31.5  31.8   RDW  47.4  47.5   PLATELETCT  280  183   MPV  9.7  10.3   NEUTSPOLYS  83.30*   --    LYMPHOCYTES  10.50*   --    MONOCYTES  4.10   --    EOSINOPHILS  0.70   --    BASOPHILS  0.70   --      Recent Labs      07/18/18   1510  07/19/18   0439   SODIUM  136  135   POTASSIUM  4.2  6.4*   CHLORIDE  98  102   CO2  27  23   GLUCOSE  177*  118*   BUN  21  20         Impression:   Maddy is an 87 year old woman with an acute left MCA stroke s/p IV alteplase.  The overall stroke burden turned out to be very small, unfortunately it has still caused a severe aphasia.  I am concerned she has a severe carotid stenosis even though it wasn't interpreted as such on CTA.      Recommendations:  1.  Start aspirin 81mg daily 24 hours after alteplase.  2.  Statin  3.  Carotid ultrasound and echo  4.  PT, OT and speech when able  5.  Will  continue to follow.

## 2018-07-19 NOTE — CARE PLAN
Problem: Knowledge Deficit  Goal: Knowledge of disease process/condition, treatment plan, diagnostic tests, and medications will improve    Intervention: Assess knowledge level of disease process/condition, treatment plan, diagnostic tests, and medications  Educated patient on disease process and current medications

## 2018-07-19 NOTE — ASSESSMENT & PLAN NOTE
Resolved. Patient had mild decrease in her GFR down to 46, now >60 s/p IVF  - repeat in AM  - avoid nephrotoxic agents and renally dose medications  - monitor UOP

## 2018-07-19 NOTE — CONSULTS
Neurology Consultation        Referring Physician:  Dr. Hardin    Referral Reason:  Stroke    HPI:  Maddy is a presumed to be 83-year-old woman who was out with her friends and they noted her to develop right-sided weakness and then she vomited. EMS was called and she was transferred by [facility] after an out-of-hospital. Very little information is known about the patient and she is aphasic and no ID or belongings were brought with her to the hospital. Onset of symptoms was noted to be approximately 1:30 as reported by the EMS. She was evaluated emergently upon arrival and found to have an NIH stroke scale score of 13. She does shake her head yes or no to questions but it's unclear if these are reliable answers. EMS reports that when they first picked her up she was essentially unresponsive and not moving the right side at all. She is now moving her right leg and her right arm to some degree. She remains completely aphasic however.    Past Medical History:  Active Ambulatory Problems     Diagnosis Date Noted   • No Active Ambulatory Problems     Resolved Ambulatory Problems     Diagnosis Date Noted   • No Resolved Ambulatory Problems     No Additional Past Medical History       Medications:  Unknown    Allergies:  No Known Allergies    Social History:  Social History     Social History   • Marital status: N/A     Spouse name: N/A   • Number of children: N/A   • Years of education: N/A     Occupational History   • Not on file.     Social History Main Topics   • Smoking status: Not on file   • Smokeless tobacco: Not on file   • Alcohol use Not on file   • Drug use: Unknown   • Sexual activity: Not on file     Other Topics Concern   • Not on file     Social History Narrative   • No narrative on file       Family History:  No family history on file.    ROS:  Unobtainable    Physical Exam:  Vitals:   Vitals:    07/18/18 1630 07/18/18 1631 07/18/18 1645 07/18/18 1700   BP:       Pulse:  107 96 90   Resp: 19  19 16    SpO2:  94% 94% 93%   Weight:         General:  She is awake, in no apparent distress  Mental status: She is alert, shakes and head yes or no to questions, unable to produce any intelligible speech. Follows simple to complex commands relatively well.  Cranial Nerves:  Pupils are equal round reactive to light, there is a right facial droop extraocular movements are intact without nystagmus, facial sensation appears intact  Motor: 4/5 strength in the right upper extremity, 4/5 strength in right lower extremity, 5/5 strength on the left side  Sensory: Appears intact to light touch throughout  Coordination: No obvious ataxia  Gait:  Deferred    CT brain:  1.  Cerebral atrophy.  2.  White matter lucencies most consistent with small vessel ischemic change versus demyelination or gliosis.  3.  Otherwise, Head CT without contrast with no acute findings. No evidence of acute cerebral infarction, hemorrhage or mass lesion.    CT perfusion:  Shows a large area of decreased fatigue impacts over the left hemisphere. There is no area of completed infarct obvious on the CT perfusion    CTA:  No intracranial stenosis or occlusion noted, CT of the neck was interpreted as atherosclerotic disease without significant stenosis or occlusion. However, per my review there is rather severe calcification at the carotid bulb and proximal internal carotid artery which may be causing some degree of stenosis.    Impression:  Maddy is presenting with symptoms of an acute ischemic stroke and evidence of decreased perfusion and CT imaging in the left hemisphere. She has improved but continues to have severe deficit with an initial scale score of 13. She is within the window of IV alteplase. Unclear if she can consent for this herself but I discussed the case with Dr. Hardin and we are in agreement that she is a candidate for IV alteplase.  Since we do not know her medications related to start IV infusion until her INR came back which was  normal. She is not endovascular candidate due to no large vessel occlusion intracranially. I'm still concerned about a significant degree of stenosis at the origin of left ICA this will need further imaging.      Recommendations:  1.  Admitted to the ICU with post-TPA protocol  2. Try and obtain more information about the patient and her home medications  3.  Recommended carotid ultrasound to better characterize the left carotid potential stenosis  4.  Start antiplatelet agent 24 hours after alteplase  5.  Echocardiogram  6.  Neurology will continue to follow.

## 2018-07-19 NOTE — CONSULTS
Medical chart review completed. Patient is a 87 y.o. year-old female admitted on 7/18 as a flight from outside hospital, with right sided facial droop, aphasia, and right sided weakness. Head CT negative, CTA head/neck negative. She was seen and evaluated by neurology, NIHSS 13, for which she received tPA. MRI with left MCA ischemic stroke. ECHO pending. HgbA1c 5.8. . Carotid US pending. Patient seen by SLP today, FEES pending. She is being monitored for alcohol withdrawal due to history of heavy drinking.     Patient with multiple co-morbidities(including but not limited to hypertension, hyperlipidemia, alcohol abuse (5-6 vodka drinks per day)); with cognitive deficits and functional deficits in mobility/self-care/swallowing/speech, and Severe de-conditioning. Pre-morbidly, this patient lived in a unknown level home with unknown steps to enter, unknown if by herself or with family. The patient was evaluated by acute care Speech Language Pathology; with ongoing speech and swallowing deficits.     6 clicks score 16 mobility, 18 ADLs. She has not yet been evaluated by PT or OT due to bedrest after tPA.    The patient has the potential to be an excellent candidate for an acute inpatient rehabilitation program with a coordinated program of care at an intensity and frequency not available at a lower level of care.     She needs to complete her stroke workup, needs to be monitored for possible alcohol withdrawal for several days, and support at discharge needs to be confirmed as she may need supervision due to her aphasia.    This recommendation is substantiated by the patient's current medical condition with intervention and assessment of medical issues requiring an acute level of care for patient's safety and maximum outcome. A coordinated program of care will be provided by an interdisciplinary team including physical therapy, occupational therapy, speech language pathology, physiatry, rehab nursing and rehab  psychology. Rehab goals include improved speech and swallowing, mobility, self-care management, strength and conditioning/endurance, pain management, bowel and bladder management, mood and affect, and safety with independent home management including caregiver training. Estimated length of stay is approximately 14-21 days. Rehab potential: Good. Disposition: to pre-morbid independent living setting with supportive care of patient's family. We will continue to follow with you in anticipation of discharge to acute inpatient rehabilitation when medically stable to do so at the discretion of her  attending physician. Thank you for allowing us to participate in her care. Please call with any questions regarding this recommendation.    Serina Rodriguez M.D.  Physical Medicine and Rehabilitation

## 2018-07-19 NOTE — ASSESSMENT & PLAN NOTE
s/p alteplase on 7/18. MRI showed a small left MCA territory infarct, no hemorrhagic transformation. Echo normal. Carotid US with > 70% stenosis on the left.   - Neurology evaluated, greatly appreciate   - GI consulted for cortrak placement, placed today but unable to flush at the moment  - vascular consulted, s/p left CEA 7/22  - start statin and ASA 81mg once cortrak is available for use  Skilled at [place]

## 2018-07-19 NOTE — CARE PLAN
Problem: Safety  Goal: Will remain free from injury  Outcome: PROGRESSING AS EXPECTED  Bed in low position; wheels locked; call bell within reach; education provided; reinforce and document

## 2018-07-19 NOTE — CONSULTS
DATE OF SERVICE:  07/19/2018    PULMONARY CRITICAL CARE CONSULTATION    REQUESTING PHYSICIAN:  Jason Lauren MD    REASON FOR REQUEST:  Acute CVA, status post TPA.    HISTORY OF PRESENT ILLNESS:  This is an 83-year-old female with known past   medical history of hypertension, chronic tobacco use with 50-pack-year history   and chronic daily alcohol use that acutely had a right-sided weakness   yesterday afternoon when she was with some friends followed by some emesis,   taken to outside hospital.  Based on her NIH stroke score was sent to [facility]   for further evaluation.  Upon arrival, the patient was seen by neurology and   given TPA in and around 3:00 p.m. yesterday afternoon.  The patient has been   admitted to the intensive care unit for ongoing post-TPA protocol including   serial every hour neuro exams and strict blood pressure control.  At the   present time, the patient does indicate that she has some improvements in her   overall symptoms.  She continues to have some ongoing right-sided weakness,   but this is moderately improved compared to before.  Further, family indicates   that the patient is a chronic alcohol user, does go through withdrawal.    ALLERGIES:  No known drug allergies.    OUTPATIENT MEDICATIONS:  Losartan 25 mg.    FAMILY HISTORY:  Reviewed, not pertinent to the current situation.    PAST MEDICAL HISTORY:  As indicated above in HPI.    SOCIAL HISTORY:  A 50-pack-year history of tobacco use, ongoing daily alcohol   use.    REVIEW OF SYSTEMS:  Unable to fully obtain given the medical state.    PHYSICAL EXAMINATION:  VITAL SIGNS:  Afebrile, pulse rate 73, blood pressure 163/92, satting 93% on 2   liters.  GENERAL:  Patient appears stated age, no acute distress.  HEENT:  Normocephalic, atraumatic.  CARDIOVASCULAR:  Regular rate and rhythm.  RESPIRATORY:  Diminished, otherwise no wheezes, rales, or rhonchi.  ABDOMEN:  Soft, nontender and nondistended.  Positive bowel sounds.  EXTREMITIES:   Without edema.  NEUROLOGIC:  Patient has mild right-sided facial droop.  PSYCHIATRIC:  Normal affect and behavior.    RESULTS:  White count 8.4, H and H of 11 and 33, platelet 183.  Sodium 135,   potassium 6.4, chloride 102, bicarbonate 23, glucose 118, BUN 20, creatinine   0.7, magnesium 3.7.    IMAGING:  MRI of the brain shows moderate cerebral atrophy.  Moderate   supratentorial white matter disease most consistent with microvascular   ischemic changes.  Scattered curvilinear, gyral cortical areas of acute   infarction involving the left posterior frontal and parietal lobe over the   convexities.  Consistent with acute cerebral infarction in the territory of   the left MCA sylvian branches.  No hemorrhagic transformation.    ASSESSMENT:  1.  Acute cerebrovascular accident.  2.  Hyperkalemia.  3.  Chronic alcohol abuse.  4.  Chronic hypertension.  5.  Chronic tobacco use.  6.  Incomplete medical database.    PLAN:  This is an 83-year-old female with the above-mentioned history here   status post TPA.  We will continue q. 1 hour neuro checks per post-TPA   protocol.  Patient will be on aspiration and seizure precautions.  She will   require PT, OT, and speech evaluations.  She will be after strict BP   parameters maintaining systolic pressure less than 180 and diastolic pressure   less than 105, maintain strict bed rest until 24-hour post-TPA.  Further   secondary stroke workup in place including echocardiogram ___.  She will be on   aspirin as well as high-intensity statin.  Further concern, patient does have   chronic alcohol use and reportedly goes into withdrawal.  We will closely   monitor as this will likely be doing neurologic examination and subsequently   identifying best approach with CIWA protocol versus attempts with Precedex or   moving to phenobarbital protocol should she ensue overall control.  We will   treat her with thiamine, folate, and multivitamin for the time being.  As for   hyperkalemia, we  will repeat lab, identify whether this is true hyperkalemia   or not, does not appear to be symptomatic at this present time.  She is on   losartan, which can seriously elevate the potassium level.    Patient's prognosis is guarded.    Total critical care time not including billable procedures 32 minutes.       ____________________________________     MD MERLINE Storey / BHARAT    DD:  07/19/2018 11:32:00  DT:  07/19/2018 12:21:12    D#:  [id number]  Job#:  [id number]

## 2018-07-19 NOTE — CARE PLAN
Problem: Infection  Goal: Will remain free from infection    Intervention: Assess signs and symptoms of infection  No current signs of infection

## 2018-07-20 LAB
ANION GAP SERPL CALC-SCNC: 11 MMOL/L (ref 0–11.9)
BASOPHILS # BLD AUTO: 0.9 % (ref 0–1.8)
BASOPHILS # BLD: 0.06 K/UL (ref 0–0.12)
BUN SERPL-MCNC: 18 MG/DL (ref 8–22)
CALCIUM SERPL-MCNC: 8.8 MG/DL (ref 8.5–10.5)
CHLORIDE SERPL-SCNC: 106 MMOL/L (ref 96–112)
CO2 SERPL-SCNC: 24 MMOL/L (ref 20–33)
CREAT SERPL-MCNC: 0.62 MG/DL (ref 0.5–1.4)
EOSINOPHIL # BLD AUTO: 0.38 K/UL (ref 0–0.51)
EOSINOPHIL NFR BLD: 5.6 % (ref 0–6.9)
ERYTHROCYTE [DISTWIDTH] IN BLOOD BY AUTOMATED COUNT: 47.8 FL (ref 35.9–50)
GLUCOSE SERPL-MCNC: 91 MG/DL (ref 65–99)
HCT VFR BLD AUTO: 35.1 % (ref 37–47)
HGB BLD-MCNC: 11.9 G/DL (ref 12–16)
IMM GRANULOCYTES # BLD AUTO: 0.03 K/UL (ref 0–0.11)
IMM GRANULOCYTES NFR BLD AUTO: 0.4 % (ref 0–0.9)
LV EJECT FRACT  99904: 65
LV EJECT FRACT MOD 2C 99903: 66.3
LV EJECT FRACT MOD 4C 99902: 54.6
LV EJECT FRACT MOD BP 99901: 51.08
LYMPHOCYTES # BLD AUTO: 1.35 K/UL (ref 1–4.8)
LYMPHOCYTES NFR BLD: 19.9 % (ref 22–41)
MAGNESIUM SERPL-MCNC: 1.7 MG/DL (ref 1.5–2.5)
MCH RBC QN AUTO: 31.6 PG (ref 27–33)
MCHC RBC AUTO-ENTMCNC: 33.9 G/DL (ref 33.6–35)
MCV RBC AUTO: 93.1 FL (ref 81.4–97.8)
MONOCYTES # BLD AUTO: 0.4 K/UL (ref 0–0.85)
MONOCYTES NFR BLD AUTO: 5.9 % (ref 0–13.4)
NEUTROPHILS # BLD AUTO: 4.55 K/UL (ref 2–7.15)
NEUTROPHILS NFR BLD: 67.3 % (ref 44–72)
NRBC # BLD AUTO: 0 K/UL
NRBC BLD-RTO: 0 /100 WBC
PLATELET # BLD AUTO: 216 K/UL (ref 164–446)
PMV BLD AUTO: 9.4 FL (ref 9–12.9)
POTASSIUM SERPL-SCNC: 3.4 MMOL/L (ref 3.6–5.5)
RBC # BLD AUTO: 3.77 M/UL (ref 4.2–5.4)
SODIUM SERPL-SCNC: 141 MMOL/L (ref 135–145)
WBC # BLD AUTO: 6.8 K/UL (ref 4.8–10.8)

## 2018-07-20 PROCEDURE — G8988 SELF CARE GOAL STATUS: HCPCS | Mod: CI

## 2018-07-20 PROCEDURE — 96105 ASSESSMENT OF APHASIA: CPT

## 2018-07-20 PROCEDURE — 92612 ENDOSCOPY SWALLOW (FEES) VID: CPT

## 2018-07-20 PROCEDURE — 700101 HCHG RX REV CODE 250: Performed by: HOSPITALIST

## 2018-07-20 PROCEDURE — 93306 TTE W/DOPPLER COMPLETE: CPT | Mod: 26 | Performed by: INTERNAL MEDICINE

## 2018-07-20 PROCEDURE — 97166 OT EVAL MOD COMPLEX 45 MIN: CPT

## 2018-07-20 PROCEDURE — 770006 HCHG ROOM/CARE - MED/SURG/GYN SEMI*

## 2018-07-20 PROCEDURE — 83735 ASSAY OF MAGNESIUM: CPT

## 2018-07-20 PROCEDURE — G8987 SELF CARE CURRENT STATUS: HCPCS | Mod: CJ

## 2018-07-20 PROCEDURE — 80048 BASIC METABOLIC PNL TOTAL CA: CPT

## 2018-07-20 PROCEDURE — 85025 COMPLETE CBC W/AUTO DIFF WBC: CPT

## 2018-07-20 PROCEDURE — G8979 MOBILITY GOAL STATUS: HCPCS | Mod: CI

## 2018-07-20 PROCEDURE — 99233 SBSQ HOSP IP/OBS HIGH 50: CPT | Performed by: HOSPITALIST

## 2018-07-20 PROCEDURE — G8997 SWALLOW GOAL STATUS: HCPCS | Mod: CI

## 2018-07-20 PROCEDURE — 99233 SBSQ HOSP IP/OBS HIGH 50: CPT | Performed by: INTERNAL MEDICINE

## 2018-07-20 PROCEDURE — G8996 SWALLOW CURRENT STATUS: HCPCS | Mod: CL

## 2018-07-20 PROCEDURE — G8978 MOBILITY CURRENT STATUS: HCPCS | Mod: CJ

## 2018-07-20 PROCEDURE — 93306 TTE W/DOPPLER COMPLETE: CPT

## 2018-07-20 PROCEDURE — 97162 PT EVAL MOD COMPLEX 30 MIN: CPT

## 2018-07-20 RX ORDER — LOSARTAN POTASSIUM 50 MG/1
25 TABLET ORAL DAILY
Status: DISCONTINUED | OUTPATIENT
Start: 2018-07-21 | End: 2018-07-26 | Stop reason: HOSPADM

## 2018-07-20 RX ORDER — ASPIRIN 81 MG/1
81 TABLET, CHEWABLE ORAL DAILY
Status: DISCONTINUED | OUTPATIENT
Start: 2018-07-20 | End: 2018-07-21

## 2018-07-20 RX ADMIN — LABETALOL HYDROCHLORIDE 10 MG: 5 INJECTION, SOLUTION INTRAVENOUS at 21:26

## 2018-07-20 RX ADMIN — LABETALOL HYDROCHLORIDE 10 MG: 5 INJECTION, SOLUTION INTRAVENOUS at 17:12

## 2018-07-20 ASSESSMENT — COGNITIVE AND FUNCTIONAL STATUS - GENERAL
CLIMB 3 TO 5 STEPS WITH RAILING: TOTAL
DAILY ACTIVITIY SCORE: 21
STANDING UP FROM CHAIR USING ARMS: A LITTLE
MOVING TO AND FROM BED TO CHAIR: A LITTLE
EATING MEALS: A LITTLE
MOVING FROM LYING ON BACK TO SITTING ON SIDE OF FLAT BED: UNABLE
PERSONAL GROOMING: A LITTLE
HELP NEEDED FOR BATHING: A LITTLE
SUGGESTED CMS G CODE MODIFIER MOBILITY: CL
MOBILITY SCORE: 14
TURNING FROM BACK TO SIDE WHILE IN FLAT BAD: A LITTLE
SUGGESTED CMS G CODE MODIFIER DAILY ACTIVITY: CJ
WALKING IN HOSPITAL ROOM: A LITTLE

## 2018-07-20 ASSESSMENT — PAIN SCALES - GENERAL
PAINLEVEL_OUTOF10: 0

## 2018-07-20 ASSESSMENT — GAIT ASSESSMENTS
DEVIATION: STEP TO;DECREASED HEEL STRIKE;DECREASED TOE OFF
DISTANCE (FEET): 50
ASSISTIVE DEVICE: FRONT WHEEL WALKER
GAIT LEVEL OF ASSIST: CONTACT GUARD ASSIST

## 2018-07-20 ASSESSMENT — ACTIVITIES OF DAILY LIVING (ADL): TOILETING: INDEPENDENT

## 2018-07-20 NOTE — CARE PLAN
Problem: Knowledge Deficit  Goal: Knowledge of disease process/condition, treatment plan, diagnostic tests, and medications will improve    Intervention: Assess knowledge level of disease process/condition, treatment plan, diagnostic tests, and medications  Educated patient on disease process and current medications, as well as follow up with ST today

## 2018-07-20 NOTE — PROGRESS NOTES
Patient has non-blanching redness to sacrum.  Initially, non-blanching redness to inner left knee and outer left foot, but both instances resolved.  Minor bruising across entire body.  No other skin issues noted.

## 2018-07-20 NOTE — THERAPY
"Occupational Therapy Evaluation completed.   Functional Status:    Sup><sit: supv  Sit><stand: supv  LB dressing: min A  Standing G/H: Min A    Plan of Care: Will benefit from Occupational Therapy 3 times per week  Discharge Recommendations:  Equipment: Will Continue to Assess for Equipment Needs. Post-acute therapy Discharge to a transitional care facility for continued skilled therapy services VS HH - will continue to assess.    See \"Rehab Therapy-Acute\" Patient Summary Report for complete documentation.    Pleasant 88 y/o female admitted for CVA, s/p tPA. Pt is doing well today, has expressive aphasia but is using a letter board to communicate and is able to say a couple words singularly. Pt participated in functional mobility and ADL, appearing much more steady when using a walker. Pt favors her left side. Pt required min A when grooming via cuing to help sequence and terminate actions. OT will continue to work with pt in this setting to maximize independence, greatest need at this time is with speech. Pt's son confirms that she has a live-in RN 4 days/week. Questionable safety at home without 24/7 caregiving given difficulty with communication.   "

## 2018-07-20 NOTE — DISCHARGE PLANNING
Received TC from pt's daughter-in-law, Loenor Bobo. She states that the pt is from [place] and would like to return there for her rehabilitation. Leonor states the family is willing to  the cost of a [facility] transport for the pt. Confirmed with attending physician Dr. Lauren that he was OK with the transport.     TC to [facility] and requested that she move forward with obtaining acceptance as well as finding out the cost of transport.

## 2018-07-20 NOTE — DISCHARGE PLANNING
Physiatry  Dr. Rodriguez recommending pt may be appropriate for IRF level care pending completion of stroke workup, monitoring for possible alcohol withdrawal for several days and clarification of discharge support to facilitate safe return to community. Will follow.

## 2018-07-20 NOTE — PROGRESS NOTES
"[facility] Hospitalist Progress Note    Date of Service: 2018    Chief Complaint  89 y.o. female admitted 2018 with right sided weakness    Interval Problem Update  Ms. Bobo is an 87 year old with a past medical history of hypertension that was brought to the ER with stroke like symptoms and right sided weakness and was given alteplase. She has been admitted to the ICU for hourly neurological checks.     Her family notes that she drinks 5-6 vodka alcoholic beverages. RN notes that she has been developing a tremor.   I met with her daughter and son in law at bedside and we discussed her condition and speech path's recommendation for a temporary cortrak given silent aspiration. Ms. Bobo nodded \"yes\" to it being placed.  He speech is unintelligible.     Consultants/Specialty  Neurology  Critical Care. I discussed with Dr. Zee on ICU Hot Rounds    Disposition  neurology        Review of Systems   Unable to perform ROS: Patient nonverbal      Physical Exam  Laboratory/Imaging   Hemodynamics  Temp (24hrs), Av.8 °C (98.3 °F), Min:36.4 °C (97.6 °F), Max:37.1 °C (98.7 °F)   Temperature: 36.4 °C (97.6 °F)  Pulse  Av.6  Min: 64  Max: 107 Heart Rate (Monitored): 75  Blood Pressure : (!) 181/95, NIBP: 119/56      Respiratory      Respiration: 19, Pulse Oximetry: 100 %             Fluids    Intake/Output Summary (Last 24 hours) at 18 0719  Last data filed at 18 0600   Gross per 24 hour   Intake             1826 ml   Output              700 ml   Net             1126 ml       Nutrition  Orders Placed This Encounter   Procedures   • Diet NPO     Standing Status:   Standing     Number of Occurrences:   1     Order Specific Question:   Restrict to:     Answer:   Sips with Medications [3]     Physical Exam   Constitutional: No distress.   thin   HENT:   Head: Normocephalic and atraumatic.   Dry mucous membranes   Neck: Normal range of motion. Neck supple.   Cardiovascular: Normal rate and regular " rhythm.    No murmur heard.  Pulmonary/Chest: Effort normal. No respiratory distress. She has no wheezes.   Abdominal: Soft. She exhibits no distension. There is no tenderness.   Musculoskeletal: She exhibits no edema or tenderness.   Neurological: She is alert.   Speech is unintelligible  Right facial droop.  strength 5/5 bilaterally   Skin: Skin is warm and dry. She is not diaphoretic.   Psychiatric: She has a normal mood and affect. Her behavior is normal.   Nursing note and vitals reviewed.      Recent Labs      07/18/18   1510  07/19/18   0439   WBC  8.8  8.4   RBC  3.97*  3.68*   HEMOGLOBIN  12.5  11.7*   HEMATOCRIT  36.3*  33.6*   MCV  91.4  91.3   MCH  31.5  31.8   MCHC  34.4  34.8   RDW  47.4  47.5   PLATELETCT  280  183   MPV  9.7  10.3     Recent Labs      07/18/18   1510  07/19/18   0439   SODIUM  136  135   POTASSIUM  4.2  6.4*   CHLORIDE  98  102   CO2  27  23   GLUCOSE  177*  118*   BUN  21  20   CREATININE  1.07  0.70   CALCIUM  10.1  9.0     Recent Labs      07/18/18   1510   APTT  24.3*   INR  1.06         Recent Labs      07/19/18   0439   TRIGLYCERIDE  133   HDL  72   LDL  166*          Assessment/Plan     Acute CVA (cerebrovascular accident) (HCC)   Assessment & Plan    s/p alteplase on 7/18 with admission to the ICU for neuro-checks hourly.   Neurology consulted.   Echocardiogram pendng  Statin, ASA after 24 hours, SCDs for DVT prophylaxis.  She may require post-acute SNF or rehab.  Speech path has recommended a cortrak which patient is amenable to.  Monitor on telemetry.    MRI:  1.  Moderate cerebral atrophy.  2.  Moderate supratentorial white matter disease most consistent with microvascular ischemic change.  3.  Scattered curvilinear gyral/cortical areas of acute infarction involving the left posterior frontal lobe and parietal lobe over the convexities. This is consistent with acute cerebral infarction in the territory of left MCA sylvian branches. No   hemorrhagic transformation.         Hyperglycemia   Assessment & Plan    Admit blood sugar as elevated at 177.   Hemoglobin A1c  5.8.        Renal azotemia   Assessment & Plan    Patient has mild decrease in her GFR down to 46 Will this point give her gentle hydration.        Hypomagnesemia   Assessment & Plan    Magnesium was low at 1.3 thus 4 g of IV magnesium was given        Dyslipidemia- (present on admission)   Assessment & Plan    . High intensity statin        Hypertension   Assessment & Plan    s/p permissive hypertension in the acute stroke setting  Restart home cozaar once cortrak placed.          Quality-Core Measures   Reviewed items::  Medications reviewed, Labs reviewed and Radiology images reviewed  DVT prophylaxis pharmacological::  Enoxaparin (Lovenox)  Assessed for rehabilitation services:  Patient was assess for and/or received rehabilitation services during this hospitalization

## 2018-07-20 NOTE — THERAPY
"Physical Therapy Evaluation completed.   Bed Mobility:  Supine to Sit: Stand by Assist  Transfers: Sit to Stand: Contact Guard Assist  Gait: Level Of Assist: Contact Guard Assist with Front-Wheel Walker       Plan of Care: Will benefit from Physical Therapy 3 times per week  Discharge Recommendations: Equipment: Will Continue to Assess for Equipment Needs. Post-acute therapy Discharge to a transitional care facility for continued skilled therapy services  Rehab vs SNF    Or 24/7 assist and supervision at home.    See \"Rehab Therapy-Acute\" Patient Summary Report for complete documentation.     "

## 2018-07-20 NOTE — THERAPY
"Speech Language Therapy Evaluation completed to address speech and communication  Functional Status:  Aphasia evaluation completed. This patient presents with halting speech with paraphasia's and dysarthria. Dysarthric speech improved when cued to enunciate and use a loud voice. She has consistent recognition of her word finding issues and paraphasias and becomes frustrated easily. Using encouragement and relaxed behavior she worked towards communicating her wants and needs quite well, although slow and fatiguing. She was able to write simple phrases and questions with occasional spelling errors but clear enough to meet her needs. She used the letter board the best with minimal spelling errors. Her comprehension is her strength. She does needs complex or lengthy information broken down but is able to follow directions and understand her POC regarding therapy.   Recommendations:  She was given speech exercises to practice and encouraged to use all three modalities of speech, letter board, and spelling with her medical team and family friends.   Plan of Care: Will benefit from Speech Therapy 5 times per week  Post-Acute Therapy: Discharge to a transitional care facility for continued skilled therapy services.    See \"Rehab Therapy-Acute\" Patient Summary Report for complete documentation.   "

## 2018-07-20 NOTE — PROGRESS NOTES
Pulmonary Critical Care Progress Note      Admit Date: 7/18/2018    Chief Complaint: Acute CVA, status post TPA    History of Present Illness:  83-year-old female with known past   medical history of hypertension, chronic tobacco use with 50-pack-year history   and chronic daily alcohol use that acutely had a right-sided weakness   yesterday afternoon when she was with some friends followed by some emesis,   taken to outside hospital.  Based on her NIH stroke score was sent to [facility]   for further evaluation.  Upon arrival, the patient was seen by neurology and   given TPA in and around 3:00 p.m. yesterday afternoon.  The patient has been   admitted to the intensive care unit for ongoing post-TPA protocol including   serial every hour neuro exams and strict blood pressure control.  At the   present time, the patient does indicate that she has some improvements in her   overall symptoms.  She continues to have some ongoing right-sided weakness,   but this is moderately improved compared to before.  Further, family indicates   that the patient is a chronic alcohol user, does go through withdrawal      Interval Events:  24 hour interval history reviewed   Tm 98.7  +1.1L over last 24hr, +988cc since admit  Ct head 7/19 reviewed  Na 141  K 3.4  Cr 0.62  Mg 1.7  Remains with significant aphasia  2L Nc  Pending swallow eval      Review of Systems   Unable to perform ROS: Medical condition     Physical Exam   Constitutional: She appears well-developed and well-nourished.   HENT:   Head: Normocephalic and atraumatic.   Eyes: Conjunctivae are normal. Pupils are equal, round, and reactive to light.   Neck: No tracheal deviation present.   Cardiovascular: Normal rate and intact distal pulses.    Pulmonary/Chest: She has no wheezes. She has no rales.   Abdominal: Soft. She exhibits no distension. There is no tenderness.   Musculoskeletal: She exhibits no edema.   Neurological: She is alert.   Mild rt facial droop, aphasic    Skin: Skin is warm and dry.   Psychiatric: She has a normal mood and affect.   Nursing note and vitals reviewed.      PFSH:  No change.    Respiratory:     Pulse Oximetry: 100 %  Chest Tube Drains:                  Invalid input(s): [id number]    HemoDynamics:  Pulse: 64, Heart Rate (Monitored): 75  Blood Pressure : (!) 181/95, NIBP: 119/56         Recent Labs      07/18/18   1510   TROPONINI  <0.01       Neuro:  GCS Total Alcon Coma Score: 14           Fluids:  Intake/Output       07/18/18 0700 - 07/19/18 0659 07/19/18 0700 - 07/20/18 0659 07/20/18 0700 - 07/21/18 0659      3311-5153 0947-0718 Total 0297-3674 1349-6393 Total 6758-4381 8175-0571 Total       Intake    I.V.  --  937.1 937.1  830  996 1826  --  -- --    Magnesium Sulfate Volume -- 107.1 107.1 -- -- -- -- -- --    IV Volume (NS) -- 830 830  -- -- --    Total Intake -- 937.1 937.8  -- -- --       Output    Urine  --  1075 1075  250  450 700  --  -- --    Number of Times Voided -- 5 x 5 x 1 x -- 1 x -- -- --    Urine Void (mL) (non-catheter) -- 1075 1075 250 450 700 -- -- --    Stool  --  -- --  --  -- --  --  -- --    Number of Times Stooled -- 1 x 1 x -- -- -- -- -- --    Total Output -- 1075 1075 250 450 700 -- -- --       Net I/O     -- -137.9 -137.9  -- -- --           Recent Labs      07/18/18   1510  07/19/18   0439   SODIUM  136  135   POTASSIUM  4.2  6.4*   CHLORIDE  98  102   CO2  27  23   BUN  21  20   CREATININE  1.07  0.70   MAGNESIUM  1.3*  3.7*   CALCIUM  10.1  9.0       GI/Nutrition:    Liver Function  Recent Labs      07/18/18   1510  07/19/18 0439   ALTSGPT  13   --    ASTSGOT  22   --    ALKPHOSPHAT  44   --    TBILIRUBIN  0.6   --    GLUCOSE  177*  118*       Heme:  Recent Labs      07/18/18   1510  07/19/18 0439   RBC  3.97*  3.68*   HEMOGLOBIN  12.5  11.7*   HEMATOCRIT  36.3*  33.6*   PLATELETCT  280  183   PROTHROMBTM  13.5   --    APTT  24.3*   --    INR  1.06   --        Infectious  Disease:  Temp  Av.8 °C (98.3 °F)  Min: 36.4 °C (97.6 °F)  Max: 37.1 °C (98.7 °F)  Micro: cultures pending  Recent Labs      18   1510  18   0439   WBC  8.8  8.4   NEUTSPOLYS  83.30*   --    LYMPHOCYTES  10.50*   --    MONOCYTES  4.10   --    EOSINOPHILS  0.70   --    BASOPHILS  0.70   --    ASTSGOT  22   --    ALTSGPT  13   --    ALKPHOSPHAT  44   --    TBILIRUBIN  0.6   --      Current Facility-Administered Medications   Medication Dose Frequency Provider Last Rate Last Dose   • senna-docusate (PERICOLACE or SENOKOT S) 8.6-50 MG per tablet 2 Tab  2 Tab BID Harsh Hamilton M.D.   Stopped at 18 1800    And   • polyethylene glycol/lytes (MIRALAX) PACKET 1 Packet  1 Packet QDAY PRN Harsh Hamilton M.D.        And   • magnesium hydroxide (MILK OF MAGNESIA) suspension 30 mL  30 mL QDAY PRN Harsh Hamilton M.D.        And   • bisacodyl (DULCOLAX) suppository 10 mg  10 mg QDAY PRN Harsh Hamilton M.D.       • NS infusion   Continuous Harsh Hamilton M.D. 83 mL/hr at 18 1756     • acetaminophen (TYLENOL) tablet 650 mg  650 mg Q6HRS PRN Harsh Hamilton M.D.       • labetalol (NORMODYNE,TRANDATE) injection 10 mg  10 mg Q4HRS PRN Harsh Hamilton M.D.   10 mg at 18 1944   • atorvastatin (LIPITOR) tablet 40 mg  40 mg Q EVENING Harsh Hamilton M.D.   Stopped at 18 1800   • aspirin (ASA) tablet 325 mg  325 mg DAILY Harsh Hamilton M.D.        Or   • aspirin (ASA) chewable tab 324 mg  324 mg DAILY Harsh Hamilton M.D.        Or   • aspirin (ASA) suppository 300 mg  300 mg DAILY Harsh Hamilton M.D.   300 mg at 18 1645   • ondansetron (ZOFRAN) syringe/vial injection 4 mg  4 mg Q4HRS PRN Harsh Hamilton M.D.       • ondansetron (ZOFRAN ODT) dispertab 4 mg  4 mg Q4HRS PRN Harsh Hamilton M.D.         Last reviewed on 2018  5:39 PM by Rosa Maria Gibson, Pharmacy Intern    Quality  Measures:  Labs reviewed, Radiology images reviewed and Medications reviewed                      Assessment/Plan;  -   Acute cerebrovascular accident.   - s/p tpa   - remains aphasic   - asa/statin   - f/u echo and 2ndary cva work up   - pt/ot/speech    -  Hyperkalemia.   - resolved, now hypo   - keep K ~4    -  Hypomagnesemia   - I am replacing to keep > 2    -  Chronic alcohol abuse.   - thiamine/folate/mvi   - monitor for w/d    -  Chronic hypertension.   - start losartan    -  Chronic tobacco use.   -  on cessation    -  Incomplete medical database.    Discussed patient condition and risk of morbidity and/or mortality with RN, RT, Therapies and hospitalist and neurology.

## 2018-07-20 NOTE — CARE PLAN
Problem: Safety  Goal: Will remain free from injury  Outcome: PROGRESSING AS EXPECTED  Bed low and locked. Pathways free of clutter. Lower side rails up. Adequate lighting at night.   Intervention: Provide assistance with mobility  Two person assist with front wheel walker.       Problem: Bowel/Gastric:  Goal: Normal bowel function is maintained or improved  Outcome: PROGRESSING AS EXPECTED  Last BM PTA. Appropriate bowel protocol in place. Maintaining adequate hydration and nutrition. Monitoring labs daily.

## 2018-07-20 NOTE — THERAPY
"Speech Language Therapy FEES completed.  Functional Status: The patient presents with aphasic and dysarthric speech. Her soft palate is weak and/or incomplete closure during speech but no nasal reflux was noted during the study. Overall, she has decent pharyngeal squeeze but her timing of swallow is impaired to the point of causing deep penetration and aspiration. All episodes were silent. She was able to achieve good glottic closure but it would pop open just before or during the swallow and she aspirated the penetrated material. Images were reviewed with the patient. I recommend that she have a Cortrak placed to achieve adequate nutrition/hydration/medication management. She can have single ice chips and 1 service of applesauce or pudding or vanilla yogurt per shift using strict swallow precautions (small bites, swallow twice, clear throat) to maintain her current swallow strength. MD and RN at bedside. We discussed Cortrak and the patient agreed as a temporary measure. I do not foresee her requiring a feeding tube as a long term need. When ready she will need another instrumental evaluation due to dx silent aspiration. I recommend a modified barium swallow study in the future to further assess \"during\" the swallow.   Recommendations - Diet: Diet / Liquid Recommendation: Cortrak plus ice chips (1 at a time), and 1 serving of wet puree per shift (applesauce or vanilla yogurt, or pudding)                          Strategies: Direct supervision during meals, No Straws and Head of Bed at 90 Degrees, Swallow Twice, Small bites, clear throat                          Medication Administration: Medication Administration : Via Gastric Tube  Plan of Care: Will benefit from Speech Therapy 5 times per week  Post-Acute Therapy: Discharge to a transitional care facility for continued skilled therapy services.    See \"Rehab Therapy-Acute\" Patient Summary Report for complete documentation.   "

## 2018-07-21 PROBLEM — F10.10 ALCOHOL ABUSE: Status: ACTIVE | Noted: 2018-07-21

## 2018-07-21 PROCEDURE — 99233 SBSQ HOSP IP/OBS HIGH 50: CPT | Performed by: HOSPITALIST

## 2018-07-21 PROCEDURE — 700105 HCHG RX REV CODE 258: Performed by: HOSPITALIST

## 2018-07-21 PROCEDURE — A9270 NON-COVERED ITEM OR SERVICE: HCPCS | Performed by: HOSPITALIST

## 2018-07-21 PROCEDURE — 770020 HCHG ROOM/CARE - TELE (206)

## 2018-07-21 PROCEDURE — 93880 EXTRACRANIAL BILAT STUDY: CPT

## 2018-07-21 PROCEDURE — 700111 HCHG RX REV CODE 636 W/ 250 OVERRIDE (IP): Performed by: HOSPITALIST

## 2018-07-21 PROCEDURE — 700102 HCHG RX REV CODE 250 W/ 637 OVERRIDE(OP): Performed by: HOSPITALIST

## 2018-07-21 RX ORDER — ASPIRIN 325 MG
325 TABLET ORAL DAILY
Status: DISCONTINUED | OUTPATIENT
Start: 2018-07-21 | End: 2018-07-21

## 2018-07-21 RX ORDER — MAGNESIUM SULFATE HEPTAHYDRATE 40 MG/ML
2 INJECTION, SOLUTION INTRAVENOUS ONCE
Status: COMPLETED | OUTPATIENT
Start: 2018-07-21 | End: 2018-07-21

## 2018-07-21 RX ORDER — ASPIRIN 81 MG/1
81 TABLET, CHEWABLE ORAL DAILY
Status: DISCONTINUED | OUTPATIENT
Start: 2018-07-22 | End: 2018-07-26 | Stop reason: HOSPADM

## 2018-07-21 RX ORDER — ASPIRIN 300 MG/1
300 SUPPOSITORY RECTAL DAILY
Status: DISCONTINUED | OUTPATIENT
Start: 2018-07-21 | End: 2018-07-21

## 2018-07-21 RX ORDER — CLOPIDOGREL BISULFATE 75 MG/1
75 TABLET ORAL DAILY
Status: DISCONTINUED | OUTPATIENT
Start: 2018-07-22 | End: 2018-07-26 | Stop reason: HOSPADM

## 2018-07-21 RX ADMIN — SODIUM CHLORIDE: 9 INJECTION, SOLUTION INTRAVENOUS at 14:25

## 2018-07-21 RX ADMIN — MAGNESIUM SULFATE HEPTAHYDRATE 2 G: 40 INJECTION, SOLUTION INTRAVENOUS at 08:30

## 2018-07-21 RX ADMIN — LABETALOL HYDROCHLORIDE 10 MG: 5 INJECTION, SOLUTION INTRAVENOUS at 19:36

## 2018-07-21 RX ADMIN — ASPIRIN 300 MG: 300 SUPPOSITORY RECTAL at 12:19

## 2018-07-21 ASSESSMENT — PAIN SCALES - GENERAL: PAINLEVEL_OUTOF10: 0

## 2018-07-21 NOTE — CARE PLAN
Problem: Communication  Goal: The ability to communicate needs accurately and effectively will improve    Intervention: [place] patient and significant other/support system to call light to alert staff of needs  Expressive aphasia, uses Ipad to communicate       Problem: Safety  Goal: Will remain free from falls    Intervention: Assess risk factors for falls  Risk to fall, bed alarm on

## 2018-07-21 NOTE — CARE PLAN
Problem: Safety  Goal: Will remain free from injury  Outcome: PROGRESSING AS EXPECTED  Bed locked and in lowest position. Call light and personal belongings in reach. Bed alarm in place. Hourly rounding.     Problem: Venous Thromboembolism (VTW)/Deep Vein Thrombosis (DVT) Prevention:  Goal: Patient will participate in Venous Thrombosis (VTE)/Deep Vein Thrombosis (DVT)Prevention Measures  Outcome: PROGRESSING AS EXPECTED  SCDs in place.

## 2018-07-21 NOTE — DISCHARGE PLANNING
Chart notes indicate family requesting IRF in [place], [place] and will pay for transport to [place] facility.

## 2018-07-21 NOTE — DISCHARGE PLANNING
Anticipated Discharge Disposition: Acute Rehab    Action: Received VM and PC from pt's dgt Lazara Bobo, requesting information regarding discharge plan.  Lazara sts they would like pt to be discharged to [facility] in [facility]. Family anxious to get transport set up, and st they can pay for transport if needed.    Advised referral would be sent to facility and would need acceptance prior to discharge.  Transportation will need to be arranged as well.  Faxed choice form to [facility]i [facility].      Barriers to Discharge: none    Plan: To [facility]                  [address] [place] [address]  [id number] [phone]    Pending determination of referral.

## 2018-07-21 NOTE — PROGRESS NOTES
[facility] SSD[facility] Progress Note    Date of Service: 2018    Chief Complaint  89 y.o. female admitted 2018 with right sided weakness. S/p alteplase and ICU for hourly neuro checks.    Interval Problem Update  Daughter at the bedside. She is unable to communicate aside from yes/no questions. Unable to place cortrak overnight, has history of nasal polyps s/p surgery.    Consultants/Specialty  Neurology  Critical Care  GI     Disposition  Rehab vs SNF        Review of Systems   Unable to perform ROS: Patient nonverbal   2/2 dysarthria from CVA   Physical Exam  Laboratory/Imaging   Hemodynamics  Temp (24hrs), Av.9 °C (98.4 °F), Min:36.2 °C (97.2 °F), Max:37.7 °C (99.9 °F)   Temperature: 37.1 °C (98.8 °F)  Pulse  Av.2  Min: 64  Max: 107 Heart Rate (Monitored): 73  Blood Pressure : 155/68, NIBP: (!) 172/92      Respiratory      Respiration: 16, Pulse Oximetry: 94 %             Fluids    Intake/Output Summary (Last 24 hours) at 18 0700  Last data filed at 18 2000   Gross per 24 hour   Intake              566 ml   Output              550 ml   Net               16 ml       Nutrition  Orders Placed This Encounter   Procedures   • Diet NPO     Standing Status:   Standing     Number of Occurrences:   1     Order Specific Question:   Restrict to:     Answer:   Sips with Medications [3]     Physical Exam   Constitutional: She appears well-developed.   thin   HENT:   Head: Normocephalic and atraumatic.   Mouth/Throat: Oropharynx is clear and moist.   Eyes: EOM are normal. No scleral icterus.   Neck: Normal range of motion.   Cardiovascular: Normal rate, regular rhythm and intact distal pulses.    No murmur heard.  Pulmonary/Chest: Effort normal. No stridor. No respiratory distress. She has no rales.   Abdominal: Soft. She exhibits no distension. There is no tenderness.   Musculoskeletal: She exhibits no edema or tenderness.   Neurological: She is alert. She displays no tremor.   Speech is  unintelligible. Persistent right facial droop.  strength 5/5 bilaterally   Skin: Skin is warm and dry. She is not diaphoretic.   Psychiatric: She has a normal mood and affect. Her behavior is normal.   Vitals reviewed.      Recent Labs      07/18/18   1510  07/19/18   0439  07/20/18   0736   WBC  8.8  8.4  6.8   RBC  3.97*  3.68*  3.77*   HEMOGLOBIN  12.5  11.7*  11.9*   HEMATOCRIT  36.3*  33.6*  35.1*   MCV  91.4  91.3  93.1   MCH  31.5  31.8  31.6   MCHC  34.4  34.8  33.9   RDW  47.4  47.5  47.8   PLATELETCT  280  183  216   MPV  9.7  10.3  9.4     Recent Labs      07/18/18   1510  07/19/18   0439  07/20/18   0736   SODIUM  136  135  141   POTASSIUM  4.2  6.4*  3.4*   CHLORIDE  98  102  106   CO2  27  23  24   GLUCOSE  177*  118*  91   BUN  21 20  18   CREATININE  1.07  0.70  0.62   CALCIUM  10.1  9.0  8.8     Recent Labs      07/18/18   1510   APTT  24.3*   INR  1.06         Recent Labs      07/19/18   0439   TRIGLYCERIDE  133   HDL  72   LDL  166*          Assessment/Plan     Acute CVA (cerebrovascular accident) (HCC)- (present on admission)   Assessment & Plan    s/p alteplase on 7/18. MRI showed a small left MCA territory infarct, no hemorrhagic transformation. Echo normal.  - Neurology following, greatly appreciate   - consulted GI as cortrak not able to be placed at bedside, greatly appreciate  - start statin and ASA 81mg once cortrak is available for use  - carotid US pending        Dyslipidemia- (present on admission)   Assessment & Plan    LDL elevated at 166. Fortunately, HLD at 72.  - High intensity statin        Hypertension- (present on admission)   Assessment & Plan    s/p tpa on 7/18. Was hypertensive but now with 's.  - labetalol IV PRN  - Restart home cozaar once cortrak placed        Hypomagnesemia- (present on admission)   Assessment & Plan    Magnesium was low at 1.3, increase to 3.7 the next day (suspect lab error). Down to 1.7 today  - monitor and replete        Alcohol abuse-  (present on admission)   Assessment & Plan    Per family, she drinks 5-6 vodka beverages daily.  - monitor clinically         Hyperglycemia- (present on admission)   Assessment & Plan    Admit blood sugar as elevated at 177.   Hemoglobin A1c  5.8.        Prerenal azotemia- (present on admission)   Assessment & Plan    Resolved. Patient had mild decrease in her GFR down to 46, now >60 s/p IVF  - repeat in AM  - avoid nephrotoxic agents and renally dose medications  - monitor UOP          Quality-Core Measures   Reviewed items::  Medications reviewed, Labs reviewed and Radiology images reviewed  [place] catheter::  No [place]  DVT prophylaxis pharmacological::  Enoxaparin (Lovenox)  Assessed for rehabilitation services:  Patient was assess for and/or received rehabilitation services during this hospitalization

## 2018-07-21 NOTE — PROGRESS NOTES
Patient and family states that they weren't aware of Cortrak placement and would like to speak with the doctor before it is placed.  Dr. Jason turciosd.

## 2018-07-21 NOTE — PROGRESS NOTES
Neurology Progress Note        Subjective:  Maddy is continuing to make improvements in her speech. While it is still a struggle she was able to get a whole sentence out today.  She denies any new complaints.  They have been unable to pass a cortrak tube through her nose.    Objective:  Vitals:  Vitals:    07/20/18 2000 07/21/18 0000 07/21/18 0400 07/21/18 0800   BP: (!) 180/76 152/70 155/68 156/74   Pulse: 75 77 71 79   Resp: 17 17 16 17   Temp: 36.6 °C (97.9 °F) 36.2 °C (97.2 °F) 37.1 °C (98.8 °F) 37.4 °C (99.4 °F)   SpO2: 96% 93% 94% 95%   Weight:       Height:         General:  Awake, alert and in no apparent distress, cooperative with exam  Mental Status:  Alert, oriented to person and place, her speech has improved to 2-3 word phrases, she was able to say her first name, comprehension is intact.  Cranial Nerves:  PERRL, EOMI with no nystagmus, right facial droop, facial sensation is intact.  No dysarthria.  Motor: 5/5 throughout, no drift  Coordination:  Normal finger to nose bilaterally  Gait:  Deferred    Recent Labs      07/18/18   1510  07/19/18   0439  07/20/18   0736   WBC  8.8  8.4  6.8   RBC  3.97*  3.68*  3.77*   HEMOGLOBIN  12.5  11.7*  11.9*   HEMATOCRIT  36.3*  33.6*  35.1*   MCV  91.4  91.3  93.1   MCH  31.5  31.8  31.6   RDW  47.4  47.5  47.8   PLATELETCT  280  183  216   MPV  9.7  10.3  9.4   NEUTSPOLYS  83.30*   --   67.30   LYMPHOCYTES  10.50*   --   19.90*   MONOCYTES  4.10   --   5.90   EOSINOPHILS  0.70   --   5.60   BASOPHILS  0.70   --   0.90     Recent Labs      07/18/18   1510  07/19/18   0439  07/20/18   0736   SODIUM  136  135  141   POTASSIUM  4.2  6.4*  3.4*   CHLORIDE  98  102  106   CO2  27  23  24   GLUCOSE  177*  118*  91   BUN  21 20  18         Impression:   Maddy is an 87 year old woman with an acute left MCA stroke s/p IV alteplase.  The overall stroke burden turned out to be very small, unfortunately it has still caused a severe aphasia.  I am concerned she has a  severe carotid stenosis even though it wasn't interpreted as such on CTA, and I think if it is severe this is likely the cause of her stroke.  No embolic source identified on echo.      Recommendations:  1.  Aspirin 81mg daily  2.  Statin  3.  Still waiting on Carotid ultrasound to be done, if this confirms severe carotid stenosis on the left, consultation with vascular surgery would be warranted.  4.  PT, OT and speech when able  5.  Dr. Armstrong takes over for the neurology consult service tomorrow.

## 2018-07-21 NOTE — DISCHARGE PLANNING
Received Choice form at [phone]  Agency/Facility Name: [facility]  Referral sent per Choice form @ [phone]  Referral faxed to [phone].

## 2018-07-21 NOTE — PROGRESS NOTES
Neurology Progress Note        Subjective:  Maddy is getting more words out today, but still not able to put together an intelligible sentence.  She denies any headache, vision changes or chest pain.    Objective:  Vitals:  Vitals:    07/20/18 0500 07/20/18 0600 07/20/18 0800 07/20/18 1612   BP:       Pulse: 72 64 77 73   Resp: 12 19 12 18   Temp: 36.4 °C (97.6 °F)  36.6 °C (97.9 °F) 36.9 °C (98.4 °F)   SpO2: 100% 100% 95% 97%   Weight:       Height:         General:  Awake, alert and in no apparent distress, cooperative with exam  Mental Status:  Alert, oriented to person and place, she gets one comprehensible word out from time to time, she was able to say her first name, comprehension is intact.  Cranial Nerves:  PERRL, EOMI with no nystagmus, right facial droop, facial sensation is intact.  No dysarthria.  Motor: 5/5 throughout, no drift  Coordination:  Normal finger to nose bilaterally  Gait:  Deferred    Recent Labs      07/18/18   1510  07/19/18   0439  07/20/18   0736   WBC  8.8  8.4  6.8   RBC  3.97*  3.68*  3.77*   HEMOGLOBIN  12.5  11.7*  11.9*   HEMATOCRIT  36.3*  33.6*  35.1*   MCV  91.4  91.3  93.1   MCH  31.5  31.8  31.6   RDW  47.4  47.5  47.8   PLATELETCT  280  183  216   MPV  9.7  10.3  9.4   NEUTSPOLYS  83.30*   --   67.30   LYMPHOCYTES  10.50*   --   19.90*   MONOCYTES  4.10   --   5.90   EOSINOPHILS  0.70   --   5.60   BASOPHILS  0.70   --   0.90     Recent Labs      07/18/18   1510  07/19/18   0439  07/20/18   0736   SODIUM  136  135  141   POTASSIUM  4.2  6.4*  3.4*   CHLORIDE  98  102  106   CO2  27  23  24   GLUCOSE  177*  118*  91   BUN  21  20  18         Impression:   Maddy is an 87 year old woman with an acute left MCA stroke s/p IV alteplase.  The overall stroke burden turned out to be very small, unfortunately it has still caused a severe aphasia.  I am concerned she has a severe carotid stenosis even though it wasn't interpreted as such on CTA.  No embolic source identified on  echo.      Recommendations:  1.  Aspirin 81mg daily  2.  Statin  3.  Still waiting on Carotid ultrasound to be done  4.  PT, OT and speech when able  5.  Will continue to follow.

## 2018-07-21 NOTE — PROGRESS NOTES
Attempted to place Cortrak but was unsuccessful. Ordered smaller size, pt now sleeping. Daughter, Pascale, asked not to place cortrak until pt awake. Will place when pt is awake. Charge RN notified.

## 2018-07-21 NOTE — PROGRESS NOTES
Assumed care of pt. Expressive aphasia, uses ipad to communicate. Daughter at bedside. Tele monitor in place, sinus rhythm. 1 assist to BR with FWW. NPO, pending cortrak placement; this RN attempted twice with no success, MD notified; will pass on to IR or GI. Ice chips given to patient, shows understanding of swallow instructions. POC rehab in [place], [place]. Bed alarm on. Call light in reach, bed in low position, will continue hourly rounding.

## 2018-07-22 PROBLEM — I65.22 CAROTID ARTERY STENOSIS, SYMPTOMATIC, LEFT: Status: ACTIVE | Noted: 2018-07-22

## 2018-07-22 PROBLEM — R79.89 PRERENAL AZOTEMIA: Status: RESOLVED | Noted: 2018-07-18 | Resolved: 2018-07-22

## 2018-07-22 PROBLEM — E87.6 HYPOKALEMIA: Status: ACTIVE | Noted: 2018-07-22

## 2018-07-22 PROBLEM — R73.9 HYPERGLYCEMIA: Status: RESOLVED | Noted: 2018-07-18 | Resolved: 2018-07-22

## 2018-07-22 LAB
ANION GAP SERPL CALC-SCNC: 14 MMOL/L (ref 0–11.9)
BUN SERPL-MCNC: 18 MG/DL (ref 8–22)
CALCIUM SERPL-MCNC: 8.5 MG/DL (ref 8.5–10.5)
CHLORIDE SERPL-SCNC: 108 MMOL/L (ref 96–112)
CO2 SERPL-SCNC: 20 MMOL/L (ref 20–33)
CREAT SERPL-MCNC: 0.6 MG/DL (ref 0.5–1.4)
GLUCOSE SERPL-MCNC: 83 MG/DL (ref 65–99)
MAGNESIUM SERPL-MCNC: 1.6 MG/DL (ref 1.5–2.5)
POTASSIUM SERPL-SCNC: 3.3 MMOL/L (ref 3.6–5.5)
SODIUM SERPL-SCNC: 142 MMOL/L (ref 135–145)

## 2018-07-22 PROCEDURE — 93005 ELECTROCARDIOGRAM TRACING: CPT | Performed by: SURGERY

## 2018-07-22 PROCEDURE — 03CN0ZZ EXTIRPATION OF MATTER FROM LEFT EXTERNAL CAROTID ARTERY, OPEN APPROACH: ICD-10-PCS | Performed by: SURGERY

## 2018-07-22 PROCEDURE — 160048 HCHG OR STATISTICAL LEVEL 1-5: Performed by: SURGERY

## 2018-07-22 PROCEDURE — 80048 BASIC METABOLIC PNL TOTAL CA: CPT

## 2018-07-22 PROCEDURE — 95955 EEG DURING SURGERY: CPT | Performed by: SURGERY

## 2018-07-22 PROCEDURE — 160002 HCHG RECOVERY MINUTES (STAT): Performed by: SURGERY

## 2018-07-22 PROCEDURE — 95940 IONM IN OPERATNG ROOM 15 MIN: CPT | Performed by: SURGERY

## 2018-07-22 PROCEDURE — 160029 HCHG SURGERY MINUTES - 1ST 30 MINS LEVEL 4: Performed by: SURGERY

## 2018-07-22 PROCEDURE — 83735 ASSAY OF MAGNESIUM: CPT

## 2018-07-22 PROCEDURE — 501445 HCHG STAPLER, SKIN DISP: Performed by: SURGERY

## 2018-07-22 PROCEDURE — 770020 HCHG ROOM/CARE - TELE (206)

## 2018-07-22 PROCEDURE — C1768 GRAFT, VASCULAR: HCPCS | Performed by: SURGERY

## 2018-07-22 PROCEDURE — 99233 SBSQ HOSP IP/OBS HIGH 50: CPT | Performed by: HOSPITALIST

## 2018-07-22 PROCEDURE — 110372 HCHG SHELL REV 278: Performed by: SURGERY

## 2018-07-22 PROCEDURE — 500698 HCHG HEMOCLIP, MEDIUM: Performed by: SURGERY

## 2018-07-22 PROCEDURE — 700111 HCHG RX REV CODE 636 W/ 250 OVERRIDE (IP)

## 2018-07-22 PROCEDURE — 501837 HCHG SUTURE CV: Performed by: SURGERY

## 2018-07-22 PROCEDURE — 160036 HCHG PACU - EA ADDL 30 MINS PHASE I: Performed by: SURGERY

## 2018-07-22 PROCEDURE — 4A11X4G MONITORING OF PERIPHERAL NERVOUS ELECTRICAL ACTIVITY, INTRAOPERATIVE, EXTERNAL APPROACH: ICD-10-PCS | Performed by: SURGERY

## 2018-07-22 PROCEDURE — 95938 SOMATOSENSORY TESTING: CPT | Performed by: SURGERY

## 2018-07-22 PROCEDURE — 03UL0JZ SUPPLEMENT LEFT INTERNAL CAROTID ARTERY WITH SYNTHETIC SUBSTITUTE, OPEN APPROACH: ICD-10-PCS | Performed by: SURGERY

## 2018-07-22 PROCEDURE — A6402 STERILE GAUZE <= 16 SQ IN: HCPCS | Performed by: SURGERY

## 2018-07-22 PROCEDURE — 03CL0ZZ EXTIRPATION OF MATTER FROM LEFT INTERNAL CAROTID ARTERY, OPEN APPROACH: ICD-10-PCS | Performed by: SURGERY

## 2018-07-22 PROCEDURE — 500389 HCHG DRAIN, RESERVOIR SUCT JP 100CC: Performed by: SURGERY

## 2018-07-22 PROCEDURE — 160041 HCHG SURGERY MINUTES - EA ADDL 1 MIN LEVEL 4: Performed by: SURGERY

## 2018-07-22 PROCEDURE — 160035 HCHG PACU - 1ST 60 MINS PHASE I: Performed by: SURGERY

## 2018-07-22 PROCEDURE — 93010 ELECTROCARDIOGRAM REPORT: CPT | Performed by: INTERNAL MEDICINE

## 2018-07-22 PROCEDURE — 700111 HCHG RX REV CODE 636 W/ 250 OVERRIDE (IP): Performed by: HOSPITALIST

## 2018-07-22 PROCEDURE — 94760 N-INVAS EAR/PLS OXIMETRY 1: CPT

## 2018-07-22 PROCEDURE — 500700 HCHG HEMOCLIP, SMALL (RED): Performed by: SURGERY

## 2018-07-22 PROCEDURE — 500257: Performed by: SURGERY

## 2018-07-22 PROCEDURE — 36415 COLL VENOUS BLD VENIPUNCTURE: CPT

## 2018-07-22 PROCEDURE — 160009 HCHG ANES TIME/MIN: Performed by: SURGERY

## 2018-07-22 PROCEDURE — 700101 HCHG RX REV CODE 250

## 2018-07-22 PROCEDURE — 501838 HCHG SUTURE GENERAL: Performed by: SURGERY

## 2018-07-22 DEVICE — PATCH .8X8CM XENOSURE BIOLOGIC VASCULAR---ORDER IN MULTIPLES OF 5---: Type: IMPLANTABLE DEVICE | Status: FUNCTIONAL

## 2018-07-22 DEVICE — GRAFT IMPRA CAROTID 6X75MM: Type: IMPLANTABLE DEVICE | Status: FUNCTIONAL

## 2018-07-22 RX ORDER — ACETAMINOPHEN 500 MG
1000 TABLET ORAL EVERY 6 HOURS
Status: DISCONTINUED | OUTPATIENT
Start: 2018-07-22 | End: 2018-07-26 | Stop reason: HOSPADM

## 2018-07-22 RX ORDER — MORPHINE SULFATE 4 MG/ML
1-5 INJECTION, SOLUTION INTRAMUSCULAR; INTRAVENOUS
Status: DISCONTINUED | OUTPATIENT
Start: 2018-07-22 | End: 2018-07-26 | Stop reason: HOSPADM

## 2018-07-22 RX ORDER — ONDANSETRON 2 MG/ML
4 INJECTION INTRAMUSCULAR; INTRAVENOUS EVERY 4 HOURS PRN
Status: DISCONTINUED | OUTPATIENT
Start: 2018-07-22 | End: 2018-07-26 | Stop reason: HOSPADM

## 2018-07-22 RX ORDER — MAGNESIUM SULFATE HEPTAHYDRATE 40 MG/ML
2 INJECTION, SOLUTION INTRAVENOUS ONCE
Status: COMPLETED | OUTPATIENT
Start: 2018-07-22 | End: 2018-07-22

## 2018-07-22 RX ORDER — BUPIVACAINE HYDROCHLORIDE AND EPINEPHRINE 5; 5 MG/ML; UG/ML
INJECTION, SOLUTION EPIDURAL; INTRACAUDAL; PERINEURAL
Status: DISCONTINUED | OUTPATIENT
Start: 2018-07-22 | End: 2018-07-22 | Stop reason: HOSPADM

## 2018-07-22 RX ORDER — HEPARIN SODIUM,PORCINE 1000/ML
VIAL (ML) INJECTION
Status: DISCONTINUED | OUTPATIENT
Start: 2018-07-22 | End: 2018-07-22 | Stop reason: HOSPADM

## 2018-07-22 RX ORDER — LABETALOL HYDROCHLORIDE 5 MG/ML
5-10 INJECTION, SOLUTION INTRAVENOUS PRN
Status: DISCONTINUED | OUTPATIENT
Start: 2018-07-22 | End: 2018-07-22

## 2018-07-22 RX ORDER — LABETALOL HYDROCHLORIDE 5 MG/ML
5-10 INJECTION, SOLUTION INTRAVENOUS EVERY 4 HOURS PRN
Status: DISCONTINUED | OUTPATIENT
Start: 2018-07-22 | End: 2018-07-26 | Stop reason: HOSPADM

## 2018-07-22 RX ADMIN — MAGNESIUM SULFATE IN WATER 2 G: 40 INJECTION, SOLUTION INTRAVENOUS at 15:09

## 2018-07-22 RX ADMIN — FENTANYL CITRATE 50 MCG: 50 INJECTION, SOLUTION INTRAMUSCULAR; INTRAVENOUS at 10:50

## 2018-07-22 ASSESSMENT — PAIN SCALES - GENERAL
PAINLEVEL_OUTOF10: 0

## 2018-07-22 ASSESSMENT — ENCOUNTER SYMPTOMS
FEVER: 0
FALLS: 0
HEADACHES: 0
CHILLS: 0
FOCAL WEAKNESS: 1
SPUTUM PRODUCTION: 0
VOMITING: 0
NAUSEA: 0
SPEECH CHANGE: 1
BLURRED VISION: 0
ABDOMINAL PAIN: 0
PALPITATIONS: 0
PSYCHIATRIC NEGATIVE: 1
WEAKNESS: 0
SHORTNESS OF BREATH: 0
LOSS OF CONSCIOUSNESS: 0
COUGH: 1

## 2018-07-22 ASSESSMENT — COPD QUESTIONNAIRES
HAVE YOU SMOKED AT LEAST 100 CIGARETTES IN YOUR ENTIRE LIFE: YES
DURING THE PAST 4 WEEKS HOW MUCH DID YOU FEEL SHORT OF BREATH: NONE/LITTLE OF THE TIME
DO YOU EVER COUGH UP ANY MUCUS OR PHLEGM?: NO/ONLY WITH OCCASIONAL COLDS OR INFECTIONS
COPD SCREENING SCORE: 4

## 2018-07-22 ASSESSMENT — LIFESTYLE VARIABLES: EVER_SMOKED: YES

## 2018-07-22 NOTE — OR NURSING
POSTOP LEFT CAROTID ENDARECTOMY    PT ARRIVED INTO PACU AWAKE, WITH RIGHT RADIAL A-LINE, ZEROED, LEFT NECK DRNG CDI, DELBERT X 1 TO LEFT OF NECK, COMPRESSED DRAINING SEROSANGUINEOUS FLUID.. PT DENIED PAIN OR NAUSEA ON ARRIVAL. DELBERT DRAIN TUBING STRIPPED ON ARRIVAL TO MAINTAIN WOUND DRAINAGE.  PT DID ASK TO HAVE HOB RAISED TO PREFERRED SETTING. PT SPEECH CLEAR ALTHOUGH A LITTLE HOARSE. MOUTH SWABS AT BEDSIDE FOR COMFORT.    DR ORTEGA ROUNDED AT BEDSIDE, DISCUSSED POC WITH PT, FAMILY ENCOURAGED TO GO OUT TO BREAKFAST BECAUSE PT HAS 3 HOUR RECOVERY PERIOD.  HOSPITALIST/GI ROUNDED AT BEDSIDE TO DISCUSS POC FOR TOMORROW TO HAVE FEEDING TUBE PLACED. PT HAD QUESTIONS AND WAS ABLE TO UNDERSTAND WHY IT WAS IMPORTANT.

## 2018-07-22 NOTE — ASSESSMENT & PLAN NOTE
> 70% stenosis on the left. Now s/p left CEA today (7/22).  - vascular consulted, greatly appreciate  - drain to be removed per vascular

## 2018-07-22 NOTE — PROGRESS NOTES
Pt voided, in gown, PIV saline locked. Belongings locked in closet. Transport at bedside to take patient to pre op.     Daughter Bonnie notified about 0745 surgery; daughter will be by once patient is in PACU.

## 2018-07-22 NOTE — CONSULTS
DATE OF SERVICE:  07/22/2018    GASTROENTEROLOGY CONSULTATION    REQUESTING PHYSICIAN:  Hina Elizabeth MD    REASON FOR REQUEST:  Dysphagia, oropharyngeal phase, and stroke.    HISTORY OF PRESENT ILLNESS:  Patient is an 87-year-old female with a history   of hypertension and was noted to have an MCA stroke on presentation on the   18th of this month.  She is felt to be a candidate for TPA and received this   and since has made some improvements in her condition.  We are being asked to   evaluate her given difficulty placing a Cortrak feeding tube for unknown   reasons.  After multiple attempts, they have been unable to get one into place   to provide her with necessary feeds and medications.  The hospitalist team   spoke with interventional radiology and given need for contrast, they   suggested potential other routes to pass this NG tube.  Today, patient   underwent a carotid endarterectomy by Dr. Batista.  Procedure appears to have   gone well.  I am actually seeing the patient in the PACU hours after her   surgery and discussing all of this with her.  She is unable to speak with me   or she tries, but is garbled and not comprehensible.  However, she does appear   to understand and does shake her head correctly to questioning.  Review of   chart has taken place.  I spoke with her PACU nurse as well to obtain any   additional history.    REVIEW OF SYSTEMS:  Unable given her inability to speak.    PAST MEDICAL HISTORY:  Hypertension, urinary incontinence, peripheral vascular   disease, and stroke.    ALLERGIES:  No known drug allergies.    MEDICATIONS:  Dulcolax, Zofran, atorvastatin, Plavix, and aspirin have been   ordered, although given the fact that she does not have an enteral route, some   of these are held.    PAST SURGICAL HISTORY:  Carotid endarterectomy and total abdominal   hysterectomy.    ALLERGIES:  No known drug allergies.    SOCIAL HISTORY:  She is a former smoker, quitting 18 years ago.  She  drinks   alcohol socially.    FAMILY MEDICAL HISTORY:  Noncontributory.    PHYSICAL EXAMINATION:  VITAL SIGNS:  Blood pressure 117/62 with a heart rate of 50, respiratory rate   26, and she is afebrile.  GENERAL:  She is a pleasant female, again unable to speak effectively.  HEENT:  Reveals evidence of her recent carotid surgery.  Her extraocular   movements appear to be intact bilaterally.  Oral exam reveals a Mallampati 3   score.  CARDIOVASCULAR:  Regular rate and rhythm during the examination.  LUNGS:  Reveals that her lungs are clear to auscultation bilaterally in the   anterior lung fields.  ABDOMEN:  Soft, nontender, nondistended.  No organomegaly or masses   appreciated.  EXTREMITIES:  Evaluation reveals no pitting edema.  SKIN:  Grossly free of rashes.    LABORATORY DATA:  CBC reveals a white blood cell count 6.8, hemoglobin 11.9,   hematocrit 35.1, and platelet count 216.  Most recent BMP reveals sodium 142,   potassium 3.3, chloride 108, bicarbonate 20, BUN 18, creatinine 0.6 with   glucose of 83.    IMPRESSION, PLAN, AND MEDICAL DECISION MAKIN.  Dysphagia, oropharyngeal phase.  Clearly, the patient with dysphagia who   is undergoing speech therapy right now for improvement and very well make   significant improvements in the near future; however, right now it is desired   to have enteral feeds and we have been unable to place a Cortrak.  Hence, I   have discussed with the patient and Dr. Elizabeth the potential need to do an   EGD with potential feeding tube placement.  We talked about the difficulties   with doing this given the fact that the EGD scope does cling to the plastic on   the tube, although sometimes we can place an Endoclip with suture on the end   of the tube to help facilitate and I discussed all this with the patient   including the risks, benefits, and the alternatives, and she agrees to   proceed, nodding at my questioning.  I will have her sign consent as I do   believe she  understands and I will set her up potentially for tomorrow for Dr. Ruffin, my partner to do.  She is on anticoagulation, of course it does add   some additional risk, although with an endoscopy without any significant   therapeutics other than placement of a tube.  I think overall minimal.  2.  Stroke, see discussion above.  Again, middle cerebral artery distribution   stroke.  3.  Peripheral vascular disease.  4.  Anticoagulation therapy.       ____________________________________     MD KYE BARTON / NTS    DD:  07/22/2018 13:25:47  DT:  07/22/2018 13:53:44    D#:  [id number]  Job#:  [id number]

## 2018-07-22 NOTE — CARE PLAN
Problem: Mobility  Goal: Risk for activity intolerance will decrease    Intervention: Assess and monitor signs of activity intolerance  CGA      Problem: Skin Integrity  Goal: Risk for impaired skin integrity will decrease    Intervention: Assess risk factors for impaired skin integrity and/or pressure ulcers  Pt turns self in bed

## 2018-07-22 NOTE — PROGRESS NOTES
[facility] Hospitalist Progress Note    Date of Service: 2018    Chief Complaint  89 y.o. female admitted 2018 with right sided weakness. S/p alteplase and ICU for hourly neuro checks.    Interval Problem Update  Family at the bedside, she just returned from her left CEA this AM. Doing well and able to articulate slightly better than yesterday.     Consultants/Specialty  Neurology  Critical Care  GI     Disposition  Rehab vs SNF        Review of Systems   Constitutional: Positive for malaise/fatigue. Negative for chills and fever.   HENT: Negative for congestion.    Eyes: Negative for blurred vision.   Respiratory: Positive for cough (with swallowing). Negative for sputum production and shortness of breath.    Cardiovascular: Negative for chest pain, palpitations and leg swelling.   Gastrointestinal: Negative for abdominal pain, nausea and vomiting.   Genitourinary: Negative for dysuria.   Musculoskeletal: Negative for falls.   Neurological: Positive for speech change and focal weakness (greatly improved). Negative for loss of consciousness, weakness and headaches.   Psychiatric/Behavioral: Negative.    All other systems reviewed and are negative.     Physical Exam  Laboratory/Imaging   Hemodynamics  Temp (24hrs), Av.2 °C (99 °F), Min:36.7 °C (98.1 °F), Max:37.4 °C (99.4 °F)   Temperature: 37.3 °C (99.2 °F)  Pulse  Av.2  Min: 64  Max: 107    Blood Pressure : (!) 174/76      Respiratory      Respiration: 16, Pulse Oximetry: 96 %             Fluids    Intake/Output Summary (Last 24 hours) at 18 0724  Last data filed at 18 0600   Gross per 24 hour   Intake              600 ml   Output              300 ml   Net              300 ml       Nutrition  Orders Placed This Encounter   Procedures   • DIET NPO     Standing Status:   Standing     Number of Occurrences:   8     Order Specific Question:   Type:     Answer:   At Midnight [5]     Order Specific Question:   Restrict to:     Answer:   Strict  [1]     Physical Exam   Constitutional: She is oriented to person, place, and time. She appears well-developed.   thin   HENT:   Head: Normocephalic and atraumatic.   Mouth/Throat: Oropharynx is clear and moist.   Eyes: EOM are normal. No scleral icterus.   Neck: Normal range of motion.   Left neck drain and bandage from CEA, clean and dry   Cardiovascular: Normal rate, regular rhythm and intact distal pulses.    No murmur heard.  Pulmonary/Chest: Effort normal. No stridor. No respiratory distress. She has no rales.   Abdominal: Soft. She exhibits no distension. There is no tenderness.   Musculoskeletal: She exhibits no edema or tenderness.   Neurological: She is alert and oriented to person, place, and time. She displays no tremor.   Speech delayed and slurred but now partially intelligible. Persistent right facial droop.  strength 5/5 bilaterally   Skin: Skin is warm and dry. She is not diaphoretic.   Psychiatric: She has a normal mood and affect. Her behavior is normal.   Vitals reviewed.      Recent Labs      07/20/18   0736   WBC  6.8   RBC  3.77*   HEMOGLOBIN  11.9*   HEMATOCRIT  35.1*   MCV  93.1   MCH  31.6   MCHC  33.9   RDW  47.8   PLATELETCT  216   MPV  9.4     Recent Labs      07/20/18   0736  07/22/18   0344   SODIUM  141  142   POTASSIUM  3.4*  3.3*   CHLORIDE  106  108   CO2  24  20   GLUCOSE  91  83   BUN  18  18   CREATININE  0.62  0.60   CALCIUM  8.8  8.5                      Assessment/Plan     Carotid artery stenosis, symptomatic, left- (present on admission)   Assessment & Plan    > 70% stenosis on the left. Now s/p left CEA today (7/22).  - vascular consulted, greatly appreciate  - drain to be removed per vascular        Acute CVA (cerebrovascular accident) (HCC)- (present on admission)   Assessment & Plan    s/p alteplase on 7/18. MRI showed a small left MCA territory infarct, no hemorrhagic transformation. Echo normal. Carotid US with > 70% stenosis on the left.   - Neurology evaluated,  greatly appreciate   - GI consulted for cortrak placement, planning for tomorrow  - vascular consulted, s/p left CEA today  - start statin and ASA 81mg once cortrak is available for use        Dyslipidemia- (present on admission)   Assessment & Plan    LDL elevated at 166. Fortunately, HLD at 72.  - High intensity statin        Hypertension- (present on admission)   Assessment & Plan    s/p tpa on 7/18. Now normotensive.  - labetalol IV PRN  - Restart home cozaar once cortrak placed        Hypomagnesemia- (present on admission)   Assessment & Plan    Magnesium was low at 1.3, increase to 3.7 the next day (suspect lab error). Down to 1.6 today  - monitor and replete  - start oral when able        Hypokalemia   Assessment & Plan    K low at 3.3. Likely 2/2 low mag.  - monitor and replete        Alcohol abuse- (present on admission)   Assessment & Plan    Per family, she drinks 5-6 vodka beverages daily.  - monitor clinically           Quality-Core Measures   Reviewed items::  Medications reviewed and Labs reviewed  [place] catheter::  No [place]  DVT prophylaxis pharmacological::  Enoxaparin (Lovenox)  Assessed for rehabilitation services:  Patient was assess for and/or received rehabilitation services during this hospitalization

## 2018-07-22 NOTE — PROGRESS NOTES
IMPRESSION    1. Left ICA stenosis status post operation today  2. Stroke due to 1-- embolic    MANAGEMENT    Doing well after endarterectomy    Advise watch the blood pressure  I would recommend setting the goal to be less than 140mg/90mmHg from now on  To avoid hyperperfusion syndrome-- see the reference    ________________________________________________________________________    If circumstances change do not hesitate to re-consult neurology.     At this point, patient remains stable.      Advise follow up with Soraya in stroke clinic in 2 months    Thank you for the consult.     Primo Armstrong M.D.  [facility]  12:29 AM07/23/18    ________________________________________________________________________  Scattered curvilinear gyral/cortical areas of acute infarction involving the left posterior frontal lobe and parietal lobe over the convexities.         Vitals:    07/22/18 1245 07/22/18 1300 07/22/18 1315 07/22/18 1340   BP:    119/52   Pulse: (!) 50 (!) 54 (!) 48 (!) 54   Resp: (!) 25 (!) 23 13 17   Temp:    36.8 °C (98.2 °F)   SpO2: 95% 92% 100%    Weight:       Height:           Physical Exam   Constitutional: She is oriented to person, place, and time and well-developed, well-nourished, and in no distress.   Eyes: Pupils are equal, round, and reactive to light.   Pulmonary/Chest: Effort normal.   Musculoskeletal: Normal range of motion.   Neurological: She is alert and oriented to person, place, and time.   Skin: Skin is warm.       Review of Systems   Constitutional: Negative for fever.   HENT: Positive for hearing loss.    Eyes: Negative for discharge.   Gastrointestinal: Negative for blood in stool.   Genitourinary: Negative for hematuria.   Neurological: Positive for speech change.   Psychiatric/Behavioral: Negative for memory loss.     ________________________________________________________________________    REFERENCE HYPERPERFUSION SYNDROME    CHS is not rare though (my  opinions)    Cardiol Rev. 2012 Mar-Apr;20(2):84-9. doi: 10.1097/[id number].  Cerebral hyperperfusion syndrome after carotid intervention: a review.  Nic M1, Kristine U, Patricia GL.  Author information  Abstract  Cerebral hyperperfusion syndrome (CHS) after carotid surgery, although rare, is a well-described phenomenon. Although originally described after carotid endarterectomy, it has now also been described after carotid artery stenting. It is classically described as an acute neurologic deficit occurring several days after a carotid procedure, associated with severe hypertension and preceded by a severe headache. CHS represents a spectrum of clinical symptoms ranging from severe unilateral headache, to seizures and focal neurologic defects, to intracerebral hemorrhage in its most severe form. The exact mechanism leading to CHS is unknown; however, it seems to be related to increased regional cerebral blood flow secondary to loss of cerebrovascular autoregulation. Given the significant morbidity associated with CHS, researchers have been trying to identify which patients are most at risk. This is a difficult task given the rarity of the disease and the multiple confounding factors in the patient population who undergo carotid intervention. The goal was to determine those patients most at risk preoperatively, so that they may be more closely monitored postoperatively to prevent the development of CHS and its associated morbidity. The purpose of this review was to summarize the data currently available in the literature on CHS, with emphasis on pathophysiology, risk factor assessment, diagnostic modalities, and disease management, to provide insight for future research to better elucidate how to reduce the morbidity and mortality caused by CHS.  ________________________________________________________________________

## 2018-07-22 NOTE — CONSULTS
DATE OF CONSULTATION: 7/21/2018     REFERRING PHYSICIAN: Hospitalist, MD.     CONSULTING PHYSICIAN: Rosales Cullen M.D.      REASON FOR CONSULTATION: Evaluate patient with symptomatic left carotid artery stenosis    HISTORY OF PRESENT ILLNESS: The patient is a 87-year-old female who 3 days ago experienced acute onset of expressive aphasia and right-sided weakness. The patient was taken to the hospital and has had some interval improvement within her symptoms. She does regain strength in her right upper and lower extremity but still suffers from expressive aphasia. MRI of the brain demonstrated a left hemispheric stroke consistent with an MCA distribution. CTA of the carotid arteries demonstrated a high-grade left carotid artery stenosis.    PAST MEDICAL HISTORY:  has a past medical history of Cataract; Fall; Gynecological disorder; Hypertension; Indigestion; and Urinary incontinence.     PAST SURGICAL HISTORY:  has a past surgical history that includes gyn surgery.     ALLERGIES: No Known Allergies     CURRENT MEDICATIONS:   Home Medications     Reviewed by Rosa Maria Gibson, Pharmacy Intern (Pharmacy Intern) on 07/18/18 at 1739  Med List Status: Complete   Medication Last Dose Status   losartan (COZAAR) 25 MG Tab unk Active                FAMILY HISTORY: History reviewed. No pertinent family history.     SOCIAL HISTORY:   Social History     Social History Main Topics   • Smoking status: Former Smoker     Packs/day: 1.00     Years: 50.00     Types: Cigarettes     Start date: 1/1/1950     Quit date: 1/1/2000   • Smokeless tobacco: Never Used   • Alcohol use Yes      Comment: Wine and Vodka; 5 drinks per day; 50 years   • Drug use: No   • Sexual activity: Not on file       Review of Systems:      Expressive aphasia unable to obtain a history from the patient but did obtain a history from the patient's daughter.      PHYSICAL EXAMINATION:       Constitutional:  Elderly appearing in no apparent distress   HENMT:  Slight  facial droop with expressive aphasia   Nose normal.  No thyromegaly.  Eyes:  EOMI, Conjunctiva normal, No discharge.  Neck:  Normal range of motion, No cervical tenderness,  no JVD.  Cardiovascular:  Normal heart rate, Normal rhythm, No murmurs, No rubs, No gallops.   Extremitites with intact distal pulses, no cyanosis, or edema.  Lungs:  Normal breath sounds, breath sounds clear to auscultation bilaterally,  no crackles, no wheezing.   Abdomen: Bowel sounds normal, Soft, No tenderness, No guarding, No rebound, No masses, No hepatosplenomegaly.  Skin: Warm, Dry, No erythema, No rash, no induration.  Neurologic: Slight residual weakness right upper extremity expressive aphasia           LABORATORY VALUES:   Recent Labs      07/19/18   0439 07/20/18   0736   WBC  8.4  6.8   RBC  3.68*  3.77*   HEMOGLOBIN  11.7*  11.9*   HEMATOCRIT  33.6*  35.1*   MCV  91.3  93.1   MCH  31.8  31.6   MCHC  34.8  33.9   RDW  47.5  47.8   PLATELETCT  183  216   MPV  10.3  9.4     Recent Labs      07/19/18   0439  07/20/18   0736   SODIUM  135  141   POTASSIUM  6.4*  3.4*   CHLORIDE  102  106   CO2  23  24   GLUCOSE  118*  91   BUN  20  18   CREATININE  0.70  0.62   CALCIUM  9.0  8.8                IMAGING:   CAROTID DUPLEX   Final Result      Echocardiogram Comp W/O cont   Final Result      CT-HEAD W/O   Final Result         No intracranial hemorrhage is identified.      There are periventricular and subcortical white matter changes present.  This finding is nonspecific and could be from previous small vessel ischemia, demyelination, or gliosis.      Atrophy.      Paranasal sinus disease.         MR-BRAIN-W/O   Final Result      1.  Moderate cerebral atrophy.   2.  Moderate supratentorial white matter disease most consistent with microvascular ischemic change.   3.  Scattered curvilinear gyral/cortical areas of acute infarction involving the left posterior frontal lobe and parietal lobe over the convexities. This is consistent with acute  cerebral infarction in the territory of left MCA sylvian branches. No    hemorrhagic transformation.      [id number] VIEW   Final Result      1.  No radiopaque foreign body to preclude MRI evaluation.      2.  Bladder diverticulum.      CT-CEREBRAL PERFUSION ANALYSIS   Final Result      1.  CT perfusion examination over the limited section of brain reveals 0 mL of brain parenchyma has less than 30% of cerebral blood flow (CBF).      2.  Please note that the cerebral perfusion was performed on the limited brain tissue around the basal ganglia region. Infarct/ischemia outside the CT perfusion sections can be missed in this study.      DX-CHEST-PORTABLE (1 VIEW)   Final Result      Interstitial lung disease.      CT-CTA HEAD WITH & W/O-POST PROCESS   Final Result      CT angiogram of the [place] within normal limits.      CT-CTA NECK WITH & W/O-POST PROCESSING   Final Result      Atherosclerotic disease without evidence of occlusion or significant stenosis.      CT-HEAD W/O   Final Result      1.  Cerebral atrophy.      2.  White matter lucencies most consistent with small vessel ischemic change versus demyelination or gliosis.      3.  Otherwise, Head CT without contrast with no acute findings. No evidence of acute cerebral infarction, hemorrhage or mass lesion.          IMPRESSION AND PLAN:     Active Hospital Problems    Diagnosis   • Acute CVA (cerebrovascular accident) (HCC) [I63.9]     Priority: High   • Dyslipidemia [E78.5]     Priority: Medium   • Hypomagnesemia [E83.42]     Priority: Medium   • Hypertension [I10]     Priority: Medium   • Prerenal azotemia [R79.89]     Priority: Low   • Hyperglycemia [R73.9]     Priority: Low   • Alcohol abuse [F10.10]     Symptomatic left carotid artery stenosis with left hemispheric stroke  Discussed the various options with respect to carotid endarterectomy early versus late. Based on the patient's low volume of infarct seen on MRI her recovery of strength in her  right upper lower extremity I recommended a carotid endarterectomy. We'll plan surgery for tomorrow morning.  ____________________________________   Rosales Cullen M.D.          DD: 7/21/2018   DT: 8:51 PM

## 2018-07-22 NOTE — PROGRESS NOTES
Monitor summary: SR 67-81, PA 0.20, QRS 0.08, QT 0.40, with rare PVCs per strip from monitor room.

## 2018-07-22 NOTE — PROGRESS NOTES
Pt returned from PACU with Rn; dressing intact with DELBERT drain. Tele monitor on. IVF connected. Updated daughter Bonnie on plan re: GI consult. Plan to place feeding tube tomorrow.

## 2018-07-22 NOTE — CARE PLAN
Problem: Communication  Goal: The ability to communicate needs accurately and effectively will improve  Outcome: PROGRESSING AS EXPECTED  Alternative forms of communication used due to expressive aphasia.     Problem: Mobility  Goal: Risk for activity intolerance will decrease  Outcome: PROGRESSING AS EXPECTED  Pt ambulates with SBA

## 2018-07-22 NOTE — OR NURSING
POSTOP ADDENDUM    PT CONTINUES TO BE STABLE, SURGICAL DRNG SITE REMAINED CDI, DELBERT DRAIN CONTINUES TO DRAW SEROSANGUINEOUS FROM WOUND SITE, 20 MLS EMPTIED IN PACU. RIGHT RADIAL ART LINE D/CD IN PACU, PT REMAINED STABLE, ART LINE TIP INTACT.    MOUTH SWABS GIVEN FOR COMFORT, PT PLACED BACK TO 2L FOR SLEEP, ABLE TO JOKE AND COMMUNICATE IN PACU WHEN AWAKE. PT HAD MINIMAL PAIN, MEDICATED X 1 PER MAR, NO NAUSEA, FAMILY UPDATED PT PENDING RETURNING TO ROOM.     PT MET ASPAN PHASE I CRITERIA, APPROPRIATE TO RETURN TO ROOM WITH TRANSPORT AND PACU RN WITH MONITOR FOR TELE STATUS.

## 2018-07-22 NOTE — PROGRESS NOTES
Pt AAOX4, OJEDA. Right sided facial droop noted with dysarthria and expressive aphasia. Denies pain, n/t. NPO w/ ice chips only. Pt is pending cortrak placement. Pt ambulates with SBA. Call light in reach, family at bedside.

## 2018-07-22 NOTE — PROGRESS NOTES
Assumed care of pt. Able to make needs known, expressive aphasia. SBA to bathroom/ambulation. Right facial droop present. Tele monitor in place. NPO pending cortrak placement. PIV left AC with NS at 50cc/hr. Family at bedside. Call light in reach, bed in low position, will continue hourly rounding.

## 2018-07-22 NOTE — OP REPORT
Maddy Bobo     07/22/18    Pre-operative Diagnosis - Symptomatic Left Carotid Artery Stenosis, Left Hemisphere CVA    Post-operative Diagnosis - Same     Procedure Performed - Left Carotid Endarterectomy     Anesthesiologist - [facility]     Surgeon - Rosales Cullen M.D.    First Assistant - YUMIKO Friedman    Anesthesia- General with EEG monitoring     Indication - 87 y.o.    Procedure in Detail - the patient was taken to the operating suite placed in supine position after adequate general endotracheal intubation the patient's left neck was prepped and draped in sterile fashion.     Marcaine with epinephrine was infiltrated into the subcutaneous tissue along the anterior border of the sternomastoid muscle. Incision was made along the anterior border incision was carried down to the platysmal layer. The dissection took place along the anterior aspect of the cerebrovascular muscle retracting the muscle laterally. The jugular vein was identified and dissection took place along the anterior aspect of the jugular vein with multiple tributaries being ligated between ties and hemoclips. The vein was retracted laterally exposing the common carotid artery common carotid internal carotid and external carotid were all circumferentially dissected and isolated with Vesseloops. Great care was taken to preserve all cranial nerve structures including the hypoglossal nerve and the vagus nerve. Once all vessels were carefully dissected and encircled with Vesseloops 6000 units of heparin was administered. After appropriate. The vessels were sequentially clamped. There was a drop in the EEG signal  at the time of carotid crossclamping. A longitudinal arteriotomy was made in the common carotid artery a shunt was placed with resumption of normal EEG signal.   Next a standard  a standard endarterectomy was performed of the bifurcation distal common carotid and proximal internal carotid artery. An eversion technique was  used on the external carotid. After the endarterectomy was completed the distal endpoint was tacked using several 6-0 Prolene sutures. When the areas free of all atheromatous debris the bovine patch was selected and sewn in circumferentially using 5-0 Prolene sutures. Just prior to completing the patch, the shunt was removed and  all vessels were vigorously for 1st and back flushed the patch was completed and flow was established 1st to the external and subsequently to the internal carotid artery. Protamine was administered and hemostasis was complete. A 10 flat fluted Javier-Chairez drain was left within the neck (separate stab incision and sutured to the skin. 2-0 Vicryl interrupted sutures were placed platysmal layer followed by 40 strut affixed to reapproximate the skin. Sterile dressings were applied and the patient was taken to the recovery room.      Rosales Cullen M.D.

## 2018-07-23 ENCOUNTER — APPOINTMENT (OUTPATIENT)
Dept: RADIOLOGY | Facility: MEDICAL CENTER | Age: 83
DRG: 038 | End: 2018-07-23
Attending: HOSPITALIST
Payer: MEDICARE

## 2018-07-23 ENCOUNTER — APPOINTMENT (OUTPATIENT)
Dept: RADIOLOGY | Facility: MEDICAL CENTER | Age: 83
DRG: 038 | End: 2018-07-23
Attending: INTERNAL MEDICINE
Payer: MEDICARE

## 2018-07-23 PROBLEM — D62 ACUTE BLOOD LOSS ANEMIA: Status: ACTIVE | Noted: 2018-07-23

## 2018-07-23 LAB
ANION GAP SERPL CALC-SCNC: 12 MMOL/L (ref 0–11.9)
BUN SERPL-MCNC: 24 MG/DL (ref 8–22)
CALCIUM SERPL-MCNC: 8.5 MG/DL (ref 8.5–10.5)
CHLORIDE SERPL-SCNC: 109 MMOL/L (ref 96–112)
CO2 SERPL-SCNC: 21 MMOL/L (ref 20–33)
CREAT SERPL-MCNC: 0.68 MG/DL (ref 0.5–1.4)
EKG IMPRESSION: NORMAL
ERYTHROCYTE [DISTWIDTH] IN BLOOD BY AUTOMATED COUNT: 49.2 FL (ref 35.9–50)
GLUCOSE SERPL-MCNC: 98 MG/DL (ref 65–99)
HCT VFR BLD AUTO: 29 % (ref 37–47)
HGB BLD-MCNC: 9.6 G/DL (ref 12–16)
MAGNESIUM SERPL-MCNC: 2 MG/DL (ref 1.5–2.5)
MCH RBC QN AUTO: 31.6 PG (ref 27–33)
MCHC RBC AUTO-ENTMCNC: 33.1 G/DL (ref 33.6–35)
MCV RBC AUTO: 95.4 FL (ref 81.4–97.8)
PLATELET # BLD AUTO: 186 K/UL (ref 164–446)
PMV BLD AUTO: 9.7 FL (ref 9–12.9)
POTASSIUM SERPL-SCNC: 4 MMOL/L (ref 3.6–5.5)
RBC # BLD AUTO: 3.04 M/UL (ref 4.2–5.4)
SODIUM SERPL-SCNC: 142 MMOL/L (ref 135–145)
WBC # BLD AUTO: 8 K/UL (ref 4.8–10.8)

## 2018-07-23 PROCEDURE — 99232 SBSQ HOSP IP/OBS MODERATE 35: CPT | Performed by: HOSPITALIST

## 2018-07-23 PROCEDURE — 160035 HCHG PACU - 1ST 60 MINS PHASE I: Performed by: INTERNAL MEDICINE

## 2018-07-23 PROCEDURE — 700111 HCHG RX REV CODE 636 W/ 250 OVERRIDE (IP): Performed by: INTERNAL MEDICINE

## 2018-07-23 PROCEDURE — 80048 BASIC METABOLIC PNL TOTAL CA: CPT

## 2018-07-23 PROCEDURE — 700102 HCHG RX REV CODE 250 W/ 637 OVERRIDE(OP): Performed by: NURSE PRACTITIONER

## 2018-07-23 PROCEDURE — 700105 HCHG RX REV CODE 258: Performed by: HOSPITALIST

## 2018-07-23 PROCEDURE — 85027 COMPLETE CBC AUTOMATED: CPT

## 2018-07-23 PROCEDURE — 36415 COLL VENOUS BLD VENIPUNCTURE: CPT

## 2018-07-23 PROCEDURE — 503105 HCHG CLIP FIXING DEVICE ENDO: Performed by: INTERNAL MEDICINE

## 2018-07-23 PROCEDURE — 160208 HCHG ENDO MINUTES - EA ADDL 1 MIN LEVEL 4: Performed by: INTERNAL MEDICINE

## 2018-07-23 PROCEDURE — 0DH98UZ INSERTION OF FEEDING DEVICE INTO DUODENUM, VIA NATURAL OR ARTIFICIAL OPENING ENDOSCOPIC: ICD-10-PCS | Performed by: INTERNAL MEDICINE

## 2018-07-23 PROCEDURE — A9270 NON-COVERED ITEM OR SERVICE: HCPCS | Performed by: NURSE PRACTITIONER

## 2018-07-23 PROCEDURE — C9113 INJ PANTOPRAZOLE SODIUM, VIA: HCPCS | Performed by: INTERNAL MEDICINE

## 2018-07-23 PROCEDURE — 160009 HCHG ANES TIME/MIN: Performed by: INTERNAL MEDICINE

## 2018-07-23 PROCEDURE — 700101 HCHG RX REV CODE 250

## 2018-07-23 PROCEDURE — 83735 ASSAY OF MAGNESIUM: CPT

## 2018-07-23 PROCEDURE — 770020 HCHG ROOM/CARE - TELE (206)

## 2018-07-23 PROCEDURE — 160203 HCHG ENDO MINUTES - 1ST 30 MINS LEVEL 4: Performed by: INTERNAL MEDICINE

## 2018-07-23 PROCEDURE — 160002 HCHG RECOVERY MINUTES (STAT): Performed by: INTERNAL MEDICINE

## 2018-07-23 PROCEDURE — 700111 HCHG RX REV CODE 636 W/ 250 OVERRIDE (IP)

## 2018-07-23 PROCEDURE — 500066 HCHG BITE BLOCK, ECT: Performed by: INTERNAL MEDICINE

## 2018-07-23 PROCEDURE — 160048 HCHG OR STATISTICAL LEVEL 1-5: Performed by: INTERNAL MEDICINE

## 2018-07-23 RX ORDER — PANTOPRAZOLE SODIUM 40 MG/10ML
40 INJECTION, POWDER, LYOPHILIZED, FOR SOLUTION INTRAVENOUS DAILY
Status: DISCONTINUED | OUTPATIENT
Start: 2018-07-23 | End: 2018-07-25

## 2018-07-23 RX ADMIN — PANTOPRAZOLE SODIUM 40 MG: 40 INJECTION, POWDER, LYOPHILIZED, FOR SOLUTION INTRAVENOUS at 14:21

## 2018-07-23 RX ADMIN — SODIUM CHLORIDE: 9 INJECTION, SOLUTION INTRAVENOUS at 08:35

## 2018-07-23 ASSESSMENT — PAIN SCALES - GENERAL
PAINLEVEL_OUTOF10: 0

## 2018-07-23 ASSESSMENT — PATIENT HEALTH QUESTIONNAIRE - PHQ9
SUM OF ALL RESPONSES TO PHQ9 QUESTIONS 1 AND 2: 0
1. LITTLE INTEREST OR PLEASURE IN DOING THINGS: NOT AT ALL

## 2018-07-23 ASSESSMENT — ENCOUNTER SYMPTOMS
LOSS OF CONSCIOUSNESS: 0
FOCAL WEAKNESS: 1
PSYCHIATRIC NEGATIVE: 1
FALLS: 0
NAUSEA: 0
BLURRED VISION: 0
PALPITATIONS: 0
WEAKNESS: 0
SHORTNESS OF BREATH: 0
BLOOD IN STOOL: 0
MEMORY LOSS: 0
ABDOMINAL PAIN: 0
HEADACHES: 0
SPUTUM PRODUCTION: 0
FEVER: 0
VOMITING: 0
COUGH: 1
EYE DISCHARGE: 0
SPEECH CHANGE: 1
CHILLS: 0

## 2018-07-23 NOTE — PROGRESS NOTES
Unable to use cortrak. Dr. Elizabeth notified. Abdominal x ray ordered per verbal order with read back.

## 2018-07-23 NOTE — PROGRESS NOTES
Monitor summary: SB 52-75, CA .20, QRS .08, QT .40 with HR down to 44 and 1.6sp per strip from monitor room.

## 2018-07-23 NOTE — PROGRESS NOTES
Patient AAOx3, nodes head to yes/no questions, has expressive aphasia but able to understand and speak some words, no distress. No family in waiting room to update.

## 2018-07-23 NOTE — PROGRESS NOTES
Tele notified SOFI Giraldo of bradycardia as low as 43, non sustaining. Dr. Elizabeth notified, no new orders received.

## 2018-07-23 NOTE — DISCHARGE PLANNING
Agency/Facility Name: [facility]  Spoke To: Faisal  Outcome: Patient accepted. Most recent therapy notes and progress notes requested. Requested clinicals faxed to [facility] @ fax [phone]. Iraida(CARINA) updated.

## 2018-07-23 NOTE — PROGRESS NOTES
Monitor summary: SR 55-82, SD 0.20, QRS 0.10, QT 0.42, with rare PVCs 4 1.4 sec pauses per strip from monitor room.

## 2018-07-23 NOTE — DISCHARGE PLANNING
Anticipated Discharge Disposition: SNF    Action: pt discussed during rounds. Pt is not medically clear    LSW notified that pt has been accepted to [facility] and they have a bed for her tomorrow.    LSW spoke with Dr. Elizabeth pt is not anticipated to be medically clear tomorrow.    Barriers to Discharge: None    Plan: pt to dc [facility]

## 2018-07-23 NOTE — CARE PLAN
Problem: Fluid Volume:  Goal: Will maintain balanced intake and output  Outcome: PROGRESSING AS EXPECTED  I's and O's charted in flowsheet     Problem: Mobility  Goal: Risk for activity intolerance will decrease  Outcome: PROGRESSING AS EXPECTED  Pt up with SBA

## 2018-07-23 NOTE — DISCHARGE PLANNING
Left message for Iraida GROVES on Neuro floor updating her on the the proposed pt transfer to [facility]. Family states they are willing to  the cost of a [facility] transport for the pt.

## 2018-07-23 NOTE — ASSESSMENT & PLAN NOTE
Hemoglobin at 11.9 --> 9.6 post op from left CEA. No evidence of active bleeding, site is healing well. Mild bruising.   - monitor clinically and with CBCs

## 2018-07-23 NOTE — OR SURGEON
Immediate Post OP Note    PreOp Diagnosis: oropharyngeal dysphagia    PostOp Diagnosis: successful placement of Cortrack in duodenum; Schatzki's ring, mild erosive esophagitis, carditis, antral erosions    Procedure(s):  GASTROSCOPY WITH FEED TUBE PLACEMENT - Wound Class: Clean Contaminated    Surgeon(s):  Vanessa Ruffin M.D.    Anesthesiologist/Type of Anesthesia:  Anesthesiologist: Tim Stuart M.D./General    Surgical Staff:  Circulator: Zonia Moran R.N.  Endoscopy Technician: Paige Jimenez    Specimens removed if any:  * No specimens in log *    Estimated Blood Loss: none    Findings: as above    Complications: none      7/23/2018 11:16 AM Vanessa Ruffin M.D.

## 2018-07-23 NOTE — PROGRESS NOTES
Assumed pt care at 0700 and received bedside report.     Pt alert and oriented X 4. Expressive aphasia and R sided facial droop.  Pt denies chest pain, sob, numbness and tingling, nausea and vomiting, headache, and blurry or double vision. Pt denies pain. Pt ambulating with a walker and 1 x assist. Pt had NG tube placed today, unable to utilize, Dr. Elizabeth notified and orders received. IV fluids running.  in place. Pt bradycardic this am, Dr. Elizabeth notified and no new orders received. POC discussed and education provided on administered medications. All questions and concerns addressed. Fall precautions, hourly rounding and Q4 hour neuro checks in place.      Pt family with questions regarding Rehab, SW notified.

## 2018-07-23 NOTE — PROGRESS NOTES
Pt A&Ox4, with expressive aphasia. Denies pain, N/T. Tele monitor in place. SBA to restroom. Pt NPO, pending EGD scope tomorrow with coretrak placement. Bed alarm on, call light within reach, hourly rounding in place.

## 2018-07-23 NOTE — PROGRESS NOTES
[facility] Hospitalist Progress Note    Date of Service: 2018    Chief Complaint  89 y.o. female admitted 2018 with right sided weakness. S/p alteplase and ICU for hourly neuro checks.    Interval Problem Update  Returned from gastroscopy with cortrack placement. Having difficulty utilizing it, will obtain an XR for tube placement. Otherwise, going well with moderate to severe dysarthria. No acute overnight events.    Consultants/Specialty  Neurology  Critical Care  GI     Disposition  Rehab vs SNF        Review of Systems   Constitutional: Positive for malaise/fatigue. Negative for chills and fever.   HENT: Negative for congestion.    Eyes: Negative for blurred vision.   Respiratory: Positive for cough (with swallowing). Negative for sputum production and shortness of breath.    Cardiovascular: Negative for chest pain, palpitations and leg swelling.   Gastrointestinal: Negative for abdominal pain, nausea and vomiting.   Genitourinary: Negative for dysuria.   Musculoskeletal: Negative for falls.   Neurological: Positive for speech change and focal weakness (greatly improved). Negative for loss of consciousness, weakness and headaches.   Psychiatric/Behavioral: Negative.    All other systems reviewed and are negative.     Physical Exam  Laboratory/Imaging   Hemodynamics  Temp (24hrs), Av.6 °C (97.9 °F), Min:36.1 °C (97 °F), Max:37.4 °C (99.3 °F)   Temperature: 36.7 °C (98.1 °F)  Pulse  Av.5  Min: 48  Max: 107 Heart Rate (Monitored): 71  Blood Pressure : 159/74, NIBP: 152/60      Respiratory      Respiration: 17, Pulse Oximetry: 92 %, O2 Daily Delivery Respiratory : Silicone Nasal Cannula     Work Of Breathing / Effort: Mild  RUL Breath Sounds: Clear, RML Breath Sounds: Clear, RLL Breath Sounds: Diminished, VERITO Breath Sounds: Clear, LLL Breath Sounds: Diminished    Fluids    Intake/Output Summary (Last 24 hours) at 18 1358  Last data filed at 18 1300   Gross per 24 hour   Intake              1100 ml   Output               50 ml   Net             1050 ml       Nutrition  Orders Placed This Encounter   Procedures   • DIET NPO     Standing Status:   Standing     Number of Occurrences:   1     Order Specific Question:   Restrict to:     Answer:   Strict [1]     Physical Exam   Constitutional: She appears well-developed. No distress.   thin   HENT:   Head: Normocephalic and atraumatic.   Mouth/Throat: Oropharynx is clear and moist.   Eyes: EOM are normal. No scleral icterus.   Neck: Normal range of motion.   Left neck drain and bandage from CEA, clean and dry   Cardiovascular: Normal rate, regular rhythm and intact distal pulses.    No murmur heard.  Pulmonary/Chest: Effort normal. No stridor. No respiratory distress. She has no rales.   Abdominal: Soft. She exhibits no distension. There is no tenderness.   Musculoskeletal: She exhibits no edema or tenderness.   Neurological: She is alert. She displays no tremor.   Speech delayed and slurred but now partially intelligible. Persistent right facial droop.  5/5 strength bilaterally   Skin: Skin is warm and dry. She is not diaphoretic.   Psychiatric: She has a normal mood and affect. Her behavior is normal.   Vitals reviewed.      Recent Labs      07/23/18   0456   WBC  8.0   RBC  3.04*   HEMOGLOBIN  9.6*   HEMATOCRIT  29.0*   MCV  95.4   MCH  31.6   MCHC  33.1*   RDW  49.2   PLATELETCT  186   MPV  9.7     Recent Labs      07/22/18   0344  07/23/18   0456   SODIUM  142  142   POTASSIUM  3.3*  4.0   CHLORIDE  108  109   CO2  20  21   GLUCOSE  83  98   BUN  18  24*   CREATININE  0.60  0.68   CALCIUM  8.5  8.5                      Assessment/Plan     * Acute CVA (cerebrovascular accident) (HCC)- (present on admission)   Assessment & Plan    s/p alteplase on 7/18. MRI showed a small left MCA territory infarct, no hemorrhagic transformation. Echo normal. Carotid US with > 70% stenosis on the left.   - Neurology evaluated, greatly appreciate   - GI consulted for  cortrak placement, placed today but unable to flush at the moment  - vascular consulted, s/p left CEA 7/22  - start statin and ASA 81mg once cortrak is available for use        Carotid artery stenosis, symptomatic, left- (present on admission)   Assessment & Plan    > 70% stenosis on the left. Now s/p left CEA today (7/22).  - vascular consulted, greatly appreciate  - drain to be removed per vascular        Dyslipidemia- (present on admission)   Assessment & Plan    LDL elevated at 166. Fortunately, HLD at 72.  - High intensity statin        Hypertension- (present on admission)   Assessment & Plan    s/p tpa on 7/18. With SBP ~150, goal is <140  - labetalol IV PRN  - Restart home cozaar once cortrak placed        Hypomagnesemia- (present on admission)   Assessment & Plan    Magnesium was low at 1.3, now normalized  - monitor and replete  - start oral supplementation when able        Acute blood loss anemia   Assessment & Plan    Hemoglobin at 11.9 --> 9.6 post op from left CEA. No evidence of active bleeding, site is healing well. Mild bruising.   - monitor clinically and with CBCs        Hypokalemia   Assessment & Plan    K low at 3.3. Likely 2/2 low mag. Improved.  - monitor and replete        Alcohol abuse- (present on admission)   Assessment & Plan    Per family, she drinks 5-6 vodka beverages daily.  - monitor clinically           Quality-Core Measures   Reviewed items::  Medications reviewed and Labs reviewed  [place] catheter::  No [place]  DVT prophylaxis pharmacological::  Enoxaparin (Lovenox)  Assessed for rehabilitation services:  Patient was assess for and/or received rehabilitation services during this hospitalization

## 2018-07-24 ENCOUNTER — APPOINTMENT (OUTPATIENT)
Dept: RADIOLOGY | Facility: MEDICAL CENTER | Age: 83
DRG: 038 | End: 2018-07-24
Attending: INTERNAL MEDICINE
Payer: MEDICARE

## 2018-07-24 PROBLEM — K56.7 ILEUS (HCC): Status: ACTIVE | Noted: 2018-07-24

## 2018-07-24 LAB
ANION GAP SERPL CALC-SCNC: 14 MMOL/L (ref 0–11.9)
BUN SERPL-MCNC: 19 MG/DL (ref 8–22)
CALCIUM SERPL-MCNC: 8.3 MG/DL (ref 8.5–10.5)
CHLORIDE SERPL-SCNC: 110 MMOL/L (ref 96–112)
CO2 SERPL-SCNC: 19 MMOL/L (ref 20–33)
CREAT SERPL-MCNC: 0.54 MG/DL (ref 0.5–1.4)
ERYTHROCYTE [DISTWIDTH] IN BLOOD BY AUTOMATED COUNT: 47.8 FL (ref 35.9–50)
FOLATE SERPL-MCNC: 12 NG/ML
GLUCOSE SERPL-MCNC: 84 MG/DL (ref 65–99)
HCT VFR BLD AUTO: 27.7 % (ref 37–47)
HGB BLD-MCNC: 9.2 G/DL (ref 12–16)
MAGNESIUM SERPL-MCNC: 1.4 MG/DL (ref 1.5–2.5)
MCH RBC QN AUTO: 31.3 PG (ref 27–33)
MCHC RBC AUTO-ENTMCNC: 33.2 G/DL (ref 33.6–35)
MCV RBC AUTO: 94.2 FL (ref 81.4–97.8)
PLATELET # BLD AUTO: 176 K/UL (ref 164–446)
PMV BLD AUTO: 10 FL (ref 9–12.9)
POTASSIUM SERPL-SCNC: 3.4 MMOL/L (ref 3.6–5.5)
PROCALCITONIN SERPL-MCNC: 0.16 NG/ML
RBC # BLD AUTO: 2.94 M/UL (ref 4.2–5.4)
SODIUM SERPL-SCNC: 143 MMOL/L (ref 135–145)
VIT B12 SERPL-MCNC: 1237 PG/ML (ref 211–911)
WBC # BLD AUTO: 7.9 K/UL (ref 4.8–10.8)

## 2018-07-24 PROCEDURE — 80048 BASIC METABOLIC PNL TOTAL CA: CPT

## 2018-07-24 PROCEDURE — 85027 COMPLETE CBC AUTOMATED: CPT

## 2018-07-24 PROCEDURE — 83540 ASSAY OF IRON: CPT

## 2018-07-24 PROCEDURE — 82746 ASSAY OF FOLIC ACID SERUM: CPT

## 2018-07-24 PROCEDURE — 99233 SBSQ HOSP IP/OBS HIGH 50: CPT | Performed by: INTERNAL MEDICINE

## 2018-07-24 PROCEDURE — 97530 THERAPEUTIC ACTIVITIES: CPT

## 2018-07-24 PROCEDURE — 700111 HCHG RX REV CODE 636 W/ 250 OVERRIDE (IP): Performed by: INTERNAL MEDICINE

## 2018-07-24 PROCEDURE — 83550 IRON BINDING TEST: CPT

## 2018-07-24 PROCEDURE — 302118 SHAMPOO,NO RINSE: Performed by: INTERNAL MEDICINE

## 2018-07-24 PROCEDURE — 97112 NEUROMUSCULAR REEDUCATION: CPT

## 2018-07-24 PROCEDURE — 84145 PROCALCITONIN (PCT): CPT

## 2018-07-24 PROCEDURE — C9113 INJ PANTOPRAZOLE SODIUM, VIA: HCPCS | Performed by: INTERNAL MEDICINE

## 2018-07-24 PROCEDURE — 74018 RADEX ABDOMEN 1 VIEW: CPT

## 2018-07-24 PROCEDURE — 770020 HCHG ROOM/CARE - TELE (206)

## 2018-07-24 PROCEDURE — 97116 GAIT TRAINING THERAPY: CPT

## 2018-07-24 PROCEDURE — 92507 TX SP LANG VOICE COMM INDIV: CPT

## 2018-07-24 PROCEDURE — 97535 SELF CARE MNGMENT TRAINING: CPT

## 2018-07-24 PROCEDURE — A9270 NON-COVERED ITEM OR SERVICE: HCPCS | Performed by: HOSPITALIST

## 2018-07-24 PROCEDURE — 36415 COLL VENOUS BLD VENIPUNCTURE: CPT

## 2018-07-24 PROCEDURE — 71045 X-RAY EXAM CHEST 1 VIEW: CPT

## 2018-07-24 PROCEDURE — 700105 HCHG RX REV CODE 258: Performed by: HOSPITALIST

## 2018-07-24 PROCEDURE — 83735 ASSAY OF MAGNESIUM: CPT

## 2018-07-24 PROCEDURE — 700102 HCHG RX REV CODE 250 W/ 637 OVERRIDE(OP): Performed by: HOSPITALIST

## 2018-07-24 PROCEDURE — 82607 VITAMIN B-12: CPT

## 2018-07-24 RX ORDER — CLOPIDOGREL BISULFATE 75 MG/1
75 TABLET ORAL DAILY
Qty: 30 TAB
Start: 2018-07-25

## 2018-07-24 RX ORDER — BISACODYL 10 MG
10 SUPPOSITORY, RECTAL RECTAL
Refills: 0
Start: 2018-07-24

## 2018-07-24 RX ORDER — ASPIRIN 81 MG/1
81 TABLET, CHEWABLE ORAL DAILY
Qty: 100 TAB
Start: 2018-07-25

## 2018-07-24 RX ORDER — POLYETHYLENE GLYCOL 3350 17 G/17G
17 POWDER, FOR SOLUTION ORAL
Refills: 3
Start: 2018-07-24

## 2018-07-24 RX ORDER — AMOXICILLIN 250 MG
2 CAPSULE ORAL 2 TIMES DAILY
Qty: 30 TAB | Refills: 0 | Status: SHIPPED | OUTPATIENT
Start: 2018-07-24

## 2018-07-24 RX ORDER — ATORVASTATIN CALCIUM 40 MG/1
40 TABLET, FILM COATED ORAL EVERY EVENING
Qty: 30 TAB
Start: 2018-07-24

## 2018-07-24 RX ORDER — ACETAMINOPHEN 325 MG/1
650 TABLET ORAL EVERY 6 HOURS PRN
Qty: 30 TAB | Refills: 0 | Status: SHIPPED | OUTPATIENT
Start: 2018-07-24

## 2018-07-24 RX ORDER — PANTOPRAZOLE SODIUM 40 MG/1
40 TABLET, DELAYED RELEASE ORAL DAILY
Qty: 30 TAB
Start: 2018-07-24

## 2018-07-24 RX ORDER — ONDANSETRON 4 MG/1
4 TABLET, ORALLY DISINTEGRATING ORAL EVERY 4 HOURS PRN
Qty: 10 TAB | Refills: 0 | Status: SHIPPED | OUTPATIENT
Start: 2018-07-24

## 2018-07-24 RX ADMIN — STANDARDIZED SENNA CONCENTRATE AND DOCUSATE SODIUM 2 TABLET: 8.6; 5 TABLET, FILM COATED ORAL at 17:46

## 2018-07-24 RX ADMIN — ATORVASTATIN CALCIUM 40 MG: 40 TABLET, FILM COATED ORAL at 17:46

## 2018-07-24 RX ADMIN — PANTOPRAZOLE SODIUM 40 MG: 40 INJECTION, POWDER, LYOPHILIZED, FOR SOLUTION INTRAVENOUS at 06:20

## 2018-07-24 RX ADMIN — SODIUM CHLORIDE: 9 INJECTION, SOLUTION INTRAVENOUS at 06:19

## 2018-07-24 RX ADMIN — BISACODYL 10 MG: 10 SUPPOSITORY RECTAL at 10:26

## 2018-07-24 ASSESSMENT — COGNITIVE AND FUNCTIONAL STATUS - GENERAL
MOVING TO AND FROM BED TO CHAIR: A LITTLE
WALKING IN HOSPITAL ROOM: A LITTLE
EATING MEALS: A LITTLE
HELP NEEDED FOR BATHING: A LITTLE
DAILY ACTIVITIY SCORE: 22
CLIMB 3 TO 5 STEPS WITH RAILING: A LOT
TURNING FROM BACK TO SIDE WHILE IN FLAT BAD: A LITTLE
STANDING UP FROM CHAIR USING ARMS: A LITTLE
SUGGESTED CMS G CODE MODIFIER DAILY ACTIVITY: CJ
MOVING FROM LYING ON BACK TO SITTING ON SIDE OF FLAT BED: A LITTLE
SUGGESTED CMS G CODE MODIFIER MOBILITY: CK
MOBILITY SCORE: 17

## 2018-07-24 ASSESSMENT — ENCOUNTER SYMPTOMS
NAUSEA: 0
SHORTNESS OF BREATH: 0
CHILLS: 0
PALPITATIONS: 0
FOCAL WEAKNESS: 1
ABDOMINAL PAIN: 0
SPUTUM PRODUCTION: 0
LOSS OF CONSCIOUSNESS: 0
PSYCHIATRIC NEGATIVE: 1
VOMITING: 0
WEAKNESS: 0
COUGH: 1
FALLS: 0
BLURRED VISION: 0
HEADACHES: 0
FEVER: 0
SPEECH CHANGE: 1

## 2018-07-24 ASSESSMENT — GAIT ASSESSMENTS
DISTANCE (FEET): 100
GAIT LEVEL OF ASSIST: CONTACT GUARD ASSIST
DEVIATION: OTHER (COMMENT)

## 2018-07-24 ASSESSMENT — PAIN SCALES - GENERAL
PAINLEVEL_OUTOF10: 0

## 2018-07-24 ASSESSMENT — PATIENT HEALTH QUESTIONNAIRE - PHQ9
2. FEELING DOWN, DEPRESSED, IRRITABLE, OR HOPELESS: NOT AT ALL
1. LITTLE INTEREST OR PLEASURE IN DOING THINGS: NOT AT ALL
SUM OF ALL RESPONSES TO PHQ9 QUESTIONS 1 AND 2: 0

## 2018-07-24 NOTE — THERAPY
"Occupational Therapy Treatment completed with focus on ADLs, ADL transfers and patient education.  Functional Status:  Pt seen for OT tx. CGA supine > sit, CGA amb in room to BR w/ HHA for balance and safety. SBA for LB dressing in supported sitting. Stood at the sink for light grooming w/ setup and CGA for balance and safety. Pt able to appropriately follow commands, pt continues to have expressive aphasia. Pt pleasant and cooperative. Pt motivated to regain strength, endurance and independence in ADLs and ADL transfers.   Plan of Care: Will benefit from Occupational Therapy 3 times per week  Discharge Recommendations:  Equipment Will Continue to Assess for Equipment Needs.     See \"Rehab Therapy-Acute\" Patient Summary Report for complete documentation.   "

## 2018-07-24 NOTE — PROGRESS NOTES
Assumed pt care this evening. Pt is A/Ox4, VSS. No c/o pain at this time. Expressive aphasia present, R sided weakness present. Nasojejunal feeding tube placed to L nare 88cm, pending x-ray confirmation. DELBERT drain in place, Dr. Forrester placed orders for removal. IVF infusing. Scheduled medications given. Hourly rounding in place, will continue to monitor.

## 2018-07-24 NOTE — DIETARY
"Nutrition Services: Day 6 of admit.  Maddy Bobo is a 87 y.o. female with admitting DX of CVA (cerebral vascular accident)   Consult received for NPO x6 days    Assessment:  Ht: 162.6 cm, Wt 7/24: 53 kg via bed scale, BMI: 20.05  Diet/Intake: NPO x6 days     Evaluation:   1. Pt had a small bowel cortrak placed last night.   2. Per RN note - \"suggested ileus\", per MD continue to hold TF  3. Spoke with RN. RN states will give pt suppository today to try to have a BM.  RN states will do another abdominal x-ray tomorrow to determine if TF will be started. Discussed nutrition support with RN.   4. Per SLP note 7/20 - recommending cortrak  5. Labs: K 3.4, Magnesium 1.4  6. NS infusion @ 50 ml/hr  7. Last BM: 7/19    Recommendations/Plan:  1. Recommend starting nutrition support. If unable to start TF tomorrow recommend TPN.   2. Monitor weight.    RD following    "

## 2018-07-24 NOTE — DISCHARGE PLANNING
Received Transport Form @ 0958 from Iraida([facility])  Spoke to  Kamala @ [facility] transport    Per Kamala, they are unable to provide transport to [facility] in [place], [place]. Contacted [facility] [employer] and received quote of $1,255. Per Sandy, only available transport is on Saturday 7/28. Iraida(JAMI) notified.

## 2018-07-24 NOTE — DISCHARGE PLANNING
Anticipated Discharge Disposition: IRF    Action: LSW met with pt and pt's dtrBonnie bedside. LSW let them know that pt has been accepted to [facility]. This worker explained that [facility] transport is not available for transport and [facility] is $1255 and isn't available for transport until Saturday (7/28)  Dtr does not want to have to wait until Saturday to go.  LSW mentioned that maybe the MD would be open to the family taking the pt in their private automobile. Dtr will speak with the rest of the family and LSW will speak to the MD.    LSW asked Sara RODRIGUEZ to call [facility] to see if the could admit the pt coming in via private automobile .          Barriers to Discharge: Transport    Plan: LSW to f/u with transport

## 2018-07-24 NOTE — CARE PLAN
Problem: Safety  Goal: Will remain free from injury  Outcome: PROGRESSING AS EXPECTED  Bed alarm on, call light within reach. Hourly rounding in place.    Problem: Mobility  Goal: Risk for activity intolerance will decrease  Outcome: PROGRESSING AS EXPECTED  Pt uses call bell when need of assistance. Uses walker for ambulation.

## 2018-07-24 NOTE — PROGRESS NOTES
"Dr. Hernandez notified of Abdominal X-Ray results and \"suggested ilius\" per Dr. Hernandez continue to hold tube feed.   "

## 2018-07-24 NOTE — PROGRESS NOTES
DELBERT drain removed [er MD order. Pressure applied for 5 minutes. Dressing applied, clean, dry and intact.

## 2018-07-24 NOTE — DISCHARGE PLANNING
Anticipated Discharge Disposition: SNF    Action: pt discussed during rounds. It is anticipated that pt will be medically clear to transport tomorrow AM    LSW faxed transport request form to Sara RODRIGUEZ    Barriers to Discharge: Medical Clearance    Plan: Pt to dc to [facility]

## 2018-07-24 NOTE — PROGRESS NOTES
Surgical cross cover  Patient is doing well.  Had nasojejunal feeding tube placed.  Neck incision healing well with DELBERT drain with serous fluid   no new weakness  Patient is from [place] and will follow-up as an outpatient near her home   we will sign off.   Please reconsult as necessary  Earnest Fischer MD PhD  [facility]  Colon and Rectal Surgeon  [phone]

## 2018-07-24 NOTE — PROGRESS NOTES
Monitor summary: SB-SR 51-72, IL .20, QRS .08, QT .42 with rare pvc per strip from monitor room.

## 2018-07-24 NOTE — PROGRESS NOTES
"Pt. A&Ox4, resting in bed comfortably. Denies pain, numbness, and tingling. Pt. Bradycardic this AM. Expressive aphasia with right sided weakness and facial droop. present. Currently NPO with held tube feeds at this time due to \"suggested ileus\". Pt. Ambulates with handheld assist. POC discussed and education provided. All questions and concerns addressed. Fall precautions, hourly rounding, and Q4 neuro checks in place.   "

## 2018-07-24 NOTE — PROGRESS NOTES
GI follow up note  Re: Cortrack tube placement for OP dysphagia    Prolonged procedure time yesterday due to poor tolerance of deep sedation while trying to place tube into duodenum.  Patient then intubated and rescoped patient and with moderate difficulty was able to place Cortrack into duodenum and clipped to medial wall.    Patient not in room but having another xray for tube placement.  Had several yesterday.  Daughter states patient has been comfortable, no abd distention and has passed gas.    Impression:  1.  OP dysphagia  2.  Endoscopic Cortrack tube placement into duodenum  3.  ? Ileus.  Most likely air in bowel is from air insufflation with endoscopy.    Recs:  Signing off.  Let us know if we can assist.  Please keep in mind, she may need general anesthesia if Cortrack needs to be replaced into small bowel.

## 2018-07-24 NOTE — DISCHARGE PLANNING
Agency/Facility Name: [facility]  Spoke To: Faisal(p:[phone])  Outcome: Per Faisal, they will accept patient in private vehicle if it is cleared by MD. Faisal will need to be notified of transport time ahead of time and will need contact of family member who will be driving patient. Faisal requesting most recent progress/therapy notes. Notes faxed to fax number  [phone]. Iraida(CARINA) updated.    Upon discharge, Faisal requesting [facility], CD of imaging, DC order and DC Summary.

## 2018-07-24 NOTE — CARE PLAN
Problem: Nutritional:  Goal: Achieve adequate nutritional intake  Patient will start nutrition   Outcome: NOT MET

## 2018-07-24 NOTE — THERAPY
"Physical Therapy Treatment completed.   Bed Mobility:  Supine to Sit: Stand by Assist  Transfers: Sit to Stand: Contact Guard Assist  Gait: Level Of Assist: Contact Guard Assist with No Equipment Needed       Plan of Care: Will benefit from Physical Therapy 3 times per week  Discharge Recommendations: Equipment: Will Continue to Assess for Equipment Needs.     See \"Rehab Therapy-Acute\" Patient Summary Report for complete documentation.     Pt progressing well w/ therapy. Pt primarily limited by endurance and balance deficits. Pt ambulating w/ varying VITO causing pt to have minor LOB. Pt having difficulty w/ dual tasks causing pt to have to stop and refocus on activity. Pt did show improved tolerance for ambulation w/ intermittent rest breaks as well as improved strength to assist w/ mobility. Pt is very motivated and will benefit from intense post acute therapy.  "

## 2018-07-24 NOTE — PROGRESS NOTES
Monitor Summary: SB-SR 57-71, SD .20, QRS .08, QT .44 with 1.6 second pause per strip from monitor room

## 2018-07-25 LAB
IRON SATN MFR SERPL: 11 % (ref 15–55)
IRON SERPL-MCNC: 21 UG/DL (ref 40–170)
TIBC SERPL-MCNC: 197 UG/DL (ref 250–450)

## 2018-07-25 PROCEDURE — 700102 HCHG RX REV CODE 250 W/ 637 OVERRIDE(OP): Performed by: NURSE PRACTITIONER

## 2018-07-25 PROCEDURE — A9270 NON-COVERED ITEM OR SERVICE: HCPCS | Performed by: SURGERY

## 2018-07-25 PROCEDURE — 700102 HCHG RX REV CODE 250 W/ 637 OVERRIDE(OP): Performed by: HOSPITALIST

## 2018-07-25 PROCEDURE — 700111 HCHG RX REV CODE 636 W/ 250 OVERRIDE (IP): Performed by: INTERNAL MEDICINE

## 2018-07-25 PROCEDURE — A9270 NON-COVERED ITEM OR SERVICE: HCPCS | Performed by: HOSPITALIST

## 2018-07-25 PROCEDURE — 770006 HCHG ROOM/CARE - MED/SURG/GYN SEMI*

## 2018-07-25 PROCEDURE — 99232 SBSQ HOSP IP/OBS MODERATE 35: CPT | Performed by: INTERNAL MEDICINE

## 2018-07-25 PROCEDURE — 700105 HCHG RX REV CODE 258: Performed by: INTERNAL MEDICINE

## 2018-07-25 PROCEDURE — A9270 NON-COVERED ITEM OR SERVICE: HCPCS | Performed by: NURSE PRACTITIONER

## 2018-07-25 PROCEDURE — 700102 HCHG RX REV CODE 250 W/ 637 OVERRIDE(OP): Performed by: SURGERY

## 2018-07-25 PROCEDURE — C9113 INJ PANTOPRAZOLE SODIUM, VIA: HCPCS | Performed by: INTERNAL MEDICINE

## 2018-07-25 RX ORDER — LABETALOL HYDROCHLORIDE 5 MG/ML
10 INJECTION, SOLUTION INTRAVENOUS EVERY 4 HOURS PRN
Status: DISCONTINUED | OUTPATIENT
Start: 2018-07-25 | End: 2018-07-26 | Stop reason: HOSPADM

## 2018-07-25 RX ORDER — LOSARTAN POTASSIUM 25 MG/1
50 TABLET ORAL DAILY
Qty: 30 TAB | Refills: 0
Start: 2018-07-25

## 2018-07-25 RX ORDER — ACETAMINOPHEN 500 MG
1000 TABLET ORAL EVERY 6 HOURS
Qty: 30 TAB | Refills: 0
Start: 2018-07-25

## 2018-07-25 RX ORDER — HYDRALAZINE HYDROCHLORIDE 20 MG/ML
10 INJECTION INTRAMUSCULAR; INTRAVENOUS EVERY 4 HOURS PRN
Status: DISCONTINUED | OUTPATIENT
Start: 2018-07-25 | End: 2018-07-26 | Stop reason: HOSPADM

## 2018-07-25 RX ORDER — ENALAPRILAT 1.25 MG/ML
1.25 INJECTION INTRAVENOUS EVERY 6 HOURS PRN
Status: DISCONTINUED | OUTPATIENT
Start: 2018-07-25 | End: 2018-07-25

## 2018-07-25 RX ORDER — HYDRALAZINE HYDROCHLORIDE 20 MG/ML
10 INJECTION INTRAMUSCULAR; INTRAVENOUS EVERY 4 HOURS PRN
Status: DISCONTINUED | OUTPATIENT
Start: 2018-07-25 | End: 2018-07-25

## 2018-07-25 RX ADMIN — STANDARDIZED SENNA CONCENTRATE AND DOCUSATE SODIUM 2 TABLET: 8.6; 5 TABLET, FILM COATED ORAL at 06:03

## 2018-07-25 RX ADMIN — ACETAMINOPHEN 1000 MG: 500 TABLET, FILM COATED ORAL at 06:03

## 2018-07-25 RX ADMIN — LOSARTAN POTASSIUM 25 MG: 50 TABLET ORAL at 06:04

## 2018-07-25 RX ADMIN — ATORVASTATIN CALCIUM 40 MG: 40 TABLET, FILM COATED ORAL at 18:15

## 2018-07-25 RX ADMIN — PANTOPRAZOLE SODIUM 40 MG: 40 INJECTION, POWDER, LYOPHILIZED, FOR SOLUTION INTRAVENOUS at 06:03

## 2018-07-25 RX ADMIN — ASPIRIN 81 MG: 81 TABLET, CHEWABLE ORAL at 06:03

## 2018-07-25 RX ADMIN — CLOPIDOGREL 75 MG: 75 TABLET, FILM COATED ORAL at 06:03

## 2018-07-25 RX ADMIN — SODIUM CHLORIDE: 9 INJECTION, SOLUTION INTRAVENOUS at 15:00

## 2018-07-25 RX ADMIN — HYDRALAZINE HYDROCHLORIDE 10 MG: 20 INJECTION INTRAMUSCULAR; INTRAVENOUS at 18:14

## 2018-07-25 ASSESSMENT — PAIN SCALES - GENERAL
PAINLEVEL_OUTOF10: 0

## 2018-07-25 NOTE — PROGRESS NOTES
Monitor summary: SR , down to 40, NE 0.20, QRS 0.08, QT 0.40, with rare PVCs per strip from monitor room.

## 2018-07-25 NOTE — DISCHARGE PLANNING
Anticipated Discharge Disposition: IRF    Action: LSW made tc to Devi RODRIGUEZ ([id number]) LM for a call back in order to get [facility] time for tomorrow by 10am as pt has to check in to [facility] by 4pm    LSW spoke with Devi RODRIGUEZ transport time set for 8 am as long as [facility] can get a crew together. ABN form will need to be signed.    Bedside RN and MD notified. MD signed COBRA.    LSW met with pt and pt's dtrBonnie.  ABN form for [facility] signed. COBRA authorization obtained.      LSW faxed signed ABN form to Devi RODRIGUEZ.    Barriers to Discharge: Transport    Plan: LSW to f/u with transport time

## 2018-07-25 NOTE — DISCHARGE PLANNING
Received Transport Form @ 5204  Spoke to Clayton @ [facility]    Transport is scheduled for 07-26-18 @0800 going to [facility]  Per Clayton he will page out a crew, and return call when he has staffing for this patients transfer.  CARINA Winter has been advised..

## 2018-07-25 NOTE — PROGRESS NOTES
Pt resting in bed. Denies pain or needs at this time. 1L of oxygen applied for comfort, sats normal. TF running at 25cc per hour, denies nausea, vomiting and abdominal discomfort. Abdomen soft, has audible bowel sounds in all quadrants. Tolerating TF so far. Call light within reach. Bed alarm on. Will monitor closely.

## 2018-07-25 NOTE — CARE PLAN
Problem: Safety  Goal: Will remain free from injury    Intervention: Provide assistance with mobility  Pt up with standby assist. Call light within reach. Bed alarm on.      Problem: Skin Integrity  Goal: Risk for impaired skin integrity will decrease  Outcome: PROGRESSING AS EXPECTED  Mepilex to coccyx. Pt encouraged to turn frequently.

## 2018-07-25 NOTE — DISCHARGE SUMMARY
Discharge Summary    CHIEF COMPLAINT ON ADMISSION  Chief Complaint   Patient presents with   • Possible Stroke       Reason for Admission  Stroke     CODE STATUS  Full Code    HPI & HOSPITAL COURSE  This is a 87 y.o. female here with Possible Stroke    Please review Dr. Harsh Hamilton M.D. notes for further details of history of present illness, past medical/social/family histories, allergies and medications. Please review Dr. Kirkpatrick, Neurology. Dr. Zee, [facility]/CC, Dr. Cullen, Gen Surgery. Dr. Blankenship, Dr. Ruffin, GI consultation notes.  s/p alteplase on 7/18 and observed at ICU. MRI showed a small left MCA territory infarct, no hemorrhagic transformation. Echo normal. Carotid US with > 70% stenosis on the left. s/p L CEA by Dr. Cullen on 7/22. He was continued on aspirin, Plavix and statin, also for HPL. Neurology/NSG wantd good BP control, hopefully 140/70. I have increased losartan to achieve that. Please continue to monitor blood pressure at facility,  PT.OT and Speech evaluated. He has dysphagia. Dr. Ruffin consulted, used endoscopic guided Cortrak placement. Tube feeds initiated. Ileus noted on imaging the nest day, this resolved the day after. Tube feeds restarted. Dr. Ruffin thought it might be from insufflation of GI from tube insertion.     She has anemia, Not actively bleeding. Could be dilutional. Iron, B12, folate ordered. Low TIBC so likely anemia of chronic disease and sat percent not less than 10 so no indication of IV iron. Recheck again in the outpatient or facility. B12 and folate came back normal.    At discharge date, Maddy Bobo afebrile and hemodynamically stable.  Maddy Bobo wanted to be discharged today.    Discharge Physical Exam  Constitutional: She appears well-developed. No distress.   Thin, frail  HENT:   Head: Normocephalic and atraumatic.   Mouth/Throat: Oropharynx is clear and moist.   Eyes: EOM are normal. No scleral icterus.   Neck: Normal range of motion.   Left neck  drain and bandage from CEA, clean and dry   Cardiovascular: Normal rate, regular rhythm and intact distal pulses.    No murmur heard.  Pulmonary/Chest: Effort normal. No stridor. No respiratory distress. She has no rales.   Abdominal: Soft. She exhibits no distension. There is no tenderness.   Musculoskeletal: She exhibits no edema or tenderness.   Neurological: She is alert. She displays no tremor.   Speech delayed and slurred but now partially intelligible. Persistent right facial droop.  5/5 strength bilaterally   Skin: Skin is warm and dry. She is not diaphoretic.   Psychiatric: She has a normal mood and affect. Her behavior is normal.   Vitals reviewed.    Imaging  [id number] VIEW   Final Result         1. No evidence of bowel obstruction or ileus.   2. Feeding tube tip projecting over the distal duodenum.      DX-ABDOMEN FOR TUBE PLACEMENT   Final Result         1.  Air-filled distended loops of bowel is seen, appearance suggests ileus or enteritis. Recommend radiographic followup to resolution to exclude progression to obstruction.   2.  Dobbhoff tube tip overlying the expected location of the second or third duodenal segment.   3.  Hazy bilateral lower lobe opacities and trace pleural effusions   4.  Cardiomegaly   5.  Atherosclerosis      DX-ABDOMEN FOR TUBE PLACEMENT   Final Result      Feeding tube coiled gastric antrum with tip in expected location the gastric body.      DX-ABDOMEN FOR TUBE PLACEMENT   Final Result      Feeding tube tip projects over the expected location the gastric antrum.      CAROTID DUPLEX   Final Result      Echocardiogram Comp W/O cont   Final Result      CT-HEAD W/O   Final Result         No intracranial hemorrhage is identified.      There are periventricular and subcortical white matter changes present.  This finding is nonspecific and could be from previous small vessel ischemia, demyelination, or gliosis.      Atrophy.      Paranasal sinus disease.         MR-BRAIN-W/O    Final Result      1.  Moderate cerebral atrophy.   2.  Moderate supratentorial white matter disease most consistent with microvascular ischemic change.   3.  Scattered curvilinear gyral/cortical areas of acute infarction involving the left posterior frontal lobe and parietal lobe over the convexities. This is consistent with acute cerebral infarction in the territory of left MCA sylvian branches. No    hemorrhagic transformation.      [id number] VIEW   Final Result      1.  No radiopaque foreign body to preclude MRI evaluation.      2.  Bladder diverticulum.      CT-CEREBRAL PERFUSION ANALYSIS   Final Result      1.  CT perfusion examination over the limited section of brain reveals 0 mL of brain parenchyma has less than 30% of cerebral blood flow (CBF).      2.  Please note that the cerebral perfusion was performed on the limited brain tissue around the basal ganglia region. Infarct/ischemia outside the CT perfusion sections can be missed in this study.      DX-CHEST-PORTABLE (1 VIEW)   Final Result      Interstitial lung disease.      CT-CTA HEAD WITH & W/O-POST PROCESS   Final Result      CT angiogram of the [place] within normal limits.      CT-CTA NECK WITH & W/O-POST PROCESSING   Final Result      Atherosclerotic disease without evidence of occlusion or significant stenosis.      CT-HEAD W/O   Final Result      1.  Cerebral atrophy.      2.  White matter lucencies most consistent with small vessel ischemic change versus demyelination or gliosis.      3.  Otherwise, Head CT without contrast with no acute findings. No evidence of acute cerebral infarction, hemorrhage or mass lesion.                      Therefore, she is discharged in fair and stable condition to skilled nursing facility.    The patient met 2-midnight criteria for an inpatient stay at the time of discharge.      FOLLOW UP ITEMS POST DISCHARGE  Work-up anemia    DISCHARGE DIAGNOSES  Principal Problem:    Acute CVA (cerebrovascular  accident) (HCC) POA: Yes  Active Problems:    Carotid artery stenosis, symptomatic, left POA: Yes    Hypomagnesemia POA: Yes    Hypertension POA: Yes    Dyslipidemia POA: Yes    Alcohol abuse POA: Yes    Hypokalemia POA: Unknown    Acute blood loss anemia POA: No  Resolved Problems:    Gastroesophageal reflux disease POA: Unknown    Urinary incontinence POA: Unknown    Prerenal azotemia POA: Yes    Hyperglycemia POA: Yes      FOLLOW UP  Follow up with [place] primary provider or attending physician at [facility] upon receipt  Follow up with Dr. Cullen for postop visit  Follow up with Dr. Romero for postCortrak placement and depending on Speech therapy eval, other forms of adfvanced feeding if needed.  Follow up with  or neurologist in 1-2 weeks  Instruct Maddy Jeramie to return to the ER in the event of worsening symptoms.  the patient on the importance of compliance and the patient has agreed to proceed with all medical recommendations and follow up plan indicated above.    the patient that all medications come with benefits and risks. Risks may include permanent injury or death and these risks can be minimized with close reassessment and monitoring.    MEDICATIONS ON DISCHARGE     Medication List      START taking these medications      Instructions   * acetaminophen 325 MG Tabs  Commonly known as:  TYLENOL   Take 2 Tabs by mouth every 6 hours as needed (Mild Pain; (Pain scale 1-3); Temp greater than 100.5 F).  Dose:  650 mg     * acetaminophen 500 MG Tabs  Commonly known as:  TYLENOL   Take 2 Tabs by mouth every 6 hours.  Dose:  1000 mg     aspirin 81 MG Chew chewable tablet  Commonly known as:  ASA   Take 1 Tab by mouth every day.  Dose:  81 mg     atorvastatin 40 MG Tabs  Commonly known as:  LIPITOR   Take 1 Tab by mouth every evening.  Dose:  40 mg     bisacodyl 10 MG Supp  Commonly known as:  DULCOLAX   Insert 1 Suppository in rectum 1 time daily as needed (if magnesium hydroxide  ineffective after 24 hours).  Dose:  10 mg     clopidogrel 75 MG Tabs  Commonly known as:  PLAVIX   Take 1 Tab by mouth every day.  Dose:  75 mg     magnesium hydroxide 400 MG/5ML Susp  Commonly known as:  MILK OF MAGNESIA   Take 30 mL by mouth 1 time daily as needed (if polyethylene glycol ineffective after 24 hours).  Dose:  30 mL     ondansetron 4 MG Tbdp  Commonly known as:  ZOFRAN ODT   Take 1 Tab by mouth every four hours as needed (give PO if IV route is unavailable. May give per feeding tube.).  Dose:  4 mg     pantoprazole 40 MG Tbec  Commonly known as:  PROTONIX   Take 1 Tab by mouth every day.  Dose:  40 mg     polyethylene glycol/lytes Pack  Commonly known as:  MIRALAX   Take 1 Packet by mouth 1 time daily as needed (if sennosides and docusate ineffective after 24 hours).  Dose:  17 g     senna-docusate 8.6-50 MG Tabs  Commonly known as:  PERICOLACE or SENOKOT S   Take 2 Tabs by mouth 2 Times a Day.  Dose:  2 Tab        * This list has 2 medication(s) that are the same as other medications prescribed for you. Read the directions carefully, and ask your doctor or other care provider to review them with you.            CHANGE how you take these medications      Instructions   losartan 25 MG Tabs  What changed:  how much to take  Commonly known as:  COZAAR   Take 2 Tabs by mouth every day.  Dose:  50 mg            Allergies  No Known Allergies    DIET  Orders Placed This Encounter   Procedures   • DIET NPO     Standing Status:   Standing     Number of Occurrences:   1     Order Specific Question:   Restrict to:     Answer:   Strict [1]       ACTIVITY  As per PT/OT at skilled    LINES, DRAINS, AND WOUNDS  This is an automated list. Peripheral IVs will be removed prior to discharge.  PIV Group Right Forearm 20g Flexible Catheter (Active)   Line Secured Taped;Transparent 7/24/2018  8:00 AM   Site Condition / Description Assessed;Patent 7/24/2018  8:00 AM   Dressing Type / Description Clean;Dry;Intact 7/24/2018   8:00 AM   Dressing Status Observed 7/24/2018  8:00 AM   Saline Locked Yes 7/23/2018  8:00 AM   Infiltration Grading [facility] [place] 7/24/2018  8:00 AM   Phlebitis Scale (Used by [facility]) 0 7/24/2018  8:00 AM   Date Primary Tubing Changed 07/18/18 7/19/2018  2:29 AM   NEXT Primary Tubing Change  07/21/18 7/19/2018  2:29 AM     Enteral Tube Left Nare Nasogastric (Active)   Placement Confirmation X-Ray 7/24/2018  8:00 AM   Method of Securement Tape 7/24/2018  8:00 AM   Tube Secured at (cm) 85 7/24/2018  8:00 AM   Position Left 7/24/2018  8:00 AM   Next Closed Access Valve Change Due 07/28/18 7/24/2018  8:00 AM   Residual Characteristics None 7/23/2018  1:00 PM   Head of Bed up 30º While Tube Feeding Yes 7/23/2018  1:00 PM   Site Condition / Description Assessed;Clean;Dry ;Intact 7/24/2018  8:00 AM   Dressing Type / Description Clean ;Dry ;Intact  7/24/2018  8:00 AM   Dressing Status Observed 7/24/2018  8:00 AM   Output (mL) 5 mL 7/24/2018  4:00 AM      Surgical Incision  Incision N/A Neck (Active)   Wound Bed Other (comment) 7/23/2018 11:13 AM   Drainage  None 7/24/2018  8:00 AM   Periwound Skin Pink;Normal;Intact 7/24/2018  8:00 AM   Daily - Wound Closure Other (Comments) 7/23/2018 12:34 PM   Dressing Options Tape;Dry Gauze 7/24/2018  8:00 AM   Dressing Status / Change Clean;Dry;Intact 7/24/2018  8:00 AM   Daily - Dressing Change Observed 7/24/2018  8:00 AM                  MENTAL STATUS ON TRANSFER  Level of Consciousness: Alert  Orientation : Oriented x 4  Speech: Expressive Aphasia    CONSULTATIONS  Neurology  PMA  Surgery  GI    PROCEDURES  See Dr. Ruffin and Dr. Cullen's OP NOTES    LABORATORY  Lab Results   Component Value Date    SODIUM 143 07/24/2018    POTASSIUM 3.4 (L) 07/24/2018    CHLORIDE 110 07/24/2018    CO2 19 (L) 07/24/2018    GLUCOSE 84 07/24/2018    BUN 19 07/24/2018    CREATININE 0.54 07/24/2018        Lab Results   Component Value Date    WBC 7.9 07/24/2018    HEMOGLOBIN 9.2 (L)  07/24/2018    HEMATOCRIT 27.7 (L) 07/24/2018    PLATELETCT 176 07/24/2018        Total time of the discharge process exceeds 40 minutes.

## 2018-07-25 NOTE — CARE PLAN
Problem: Nutritional:  Goal: Achieve adequate nutritional intake  Patient will start nutrition    Outcome: MET Date Met: 07/25/18    Goal: Nutrition support tolerated and meeting greater than 85% of estimated needs  Outcome: NOT MET

## 2018-07-25 NOTE — PROGRESS NOTES
Pt with elevated BP and bradycardia. Dr. Hernandez notified. Orders received, however there is a shortage of the ordered medication per pharmacy. Awaiting new orders for BP medications from pharmacy.    1640 BP again elevated. Contacted pharmacy about getting BP medication sent from pharmacy.

## 2018-07-25 NOTE — PROGRESS NOTES
Pt. A&Ox4, no complaints of pain, numbness, or tingling. Resting in bed with daughter at bedside. Expressive aphasia with right sided weakness and facial droop still present. Currently NPO with cortrak running at 25ml/hr, tolerating well.  Pt. Ambulates with handheld assist. POC discussed and education provided. All questions and concerns addressed. Fall precautions, hourly rounding, and Q4 neuro checks in place.

## 2018-07-25 NOTE — DISCHARGE PLANNING
Anticipated Discharge Disposition: IRF    Action: LSW notified by charge [employer]in that MD is not comfortable with pt transferring to IRF via private auto with family.    LSW spoke with Devi RODRIGUEZ who will get a [facility] quote for family.     [facility] quote is $10,342.  LSW spoke with Bonnie johnson she has also called [facility]. Pt has to leave by 12 pm in order to be to [facility] by 1600.    [facility] PCS faxed to Devi RODRIGUEZ    Barriers to Discharge: Transport    Plan: LSW to f/u with transport

## 2018-07-25 NOTE — PROGRESS NOTES
"Family anxious about discharge and willing to pay \"full price out of pocket\" for transport to rehab hospital. SW notified. Stroke education book provided to pt. Preparation for discharge including core measure sheet completed.   "

## 2018-07-25 NOTE — DIETARY
Nutrition Services: Nutrition Support Assessment  Day 7 of admit.  Maddy Bobo is a 87 y.o. female with admitting DX of CVA (cerebral vascular accident)      Current problem list:  1. Ileus   2. Carotid artery stenosis, symptomatic, left  3. Acute CVA  4. Dyslipidemia  5. Hypomagnesemia  6. HTN  7. Acute blood loss anemia  8. Hypokalemia  9. Alcohol abuse     Assessment:  Estimated Nutritional Needs: based on: Ht: 162.6 cm, Wt : 55.2 kg via bed scale - wt increased suspect related to fluids or items on bed when weighed, IBW: 54.4 kg, BMI: 20.88     Calculation/Equation: REE per MSJ x 1.2 = 1168 kcal/day  Total Calories / day: 1100- 1400 (Calories / k - 25)  Total Grams Protein / day: 66  - 83 (Grams Protein / k.2 - 1.5)    Evaluation:   1. Consult received for TF assessment. TF was started last night, pt was NPO x6 days. Pt is being seen by SLP. TF appropriate. Pt is at risk for re-feeding.   2. Enteral access: Small bowel cortrak   3. Labs : K 3.4, Magnesium 1.4  4. Meds: lipitor, plavix, cozaar, protonix, pericolace  5. Last BM:   6. Replete with Fiber appropriate to meet pt's estimated protein needs.       Recommendations/Plan:  Start Replete with Fiber @ 25 ml/hr and advance per protocol to 50 ml/hr (goal rate) to provide 1200 kcal (22 kcal/kg), 77 grams protein (1.4 gm/kg), and 996 ml free water per day.   Fluids per MD.   Diet upgrades per SLP  Monitor for refeeding: Order BMP w/ Mg and Phos x 7 days. Replete K, Phos and Mg prn. Supplement 100 mg Thiamine x 7 days to reduce risk of refeeding       RD following

## 2018-07-25 NOTE — PROGRESS NOTES
[facility] Hospitalist Progress Note    Date of Service: 2018    Chief Complaint  89 y.o. female admitted 2018 with right sided weakness. S/p alteplase and ICU for hourly neuro checks.    Interval Problem Update  . Last night had GI complaints. AXR then showed ileus. Tube feeds were stopped. Daughter at bedside    Consultants/Specialty  Neurology  Critical Care  GI     Disposition  Rehab vs SNF        Review of Systems   Constitutional: Positive for malaise/fatigue. Negative for chills and fever.   HENT: Negative for congestion.    Eyes: Negative for blurred vision.   Respiratory: Positive for cough (with swallowing). Negative for sputum production and shortness of breath.    Cardiovascular: Negative for chest pain, palpitations and leg swelling.   Gastrointestinal: Negative for abdominal pain, nausea and vomiting.   Genitourinary: Negative for dysuria.   Musculoskeletal: Negative for falls.   Neurological: Positive for speech change and focal weakness (greatly improved). Negative for loss of consciousness, weakness and headaches.   Psychiatric/Behavioral: Negative.    All other systems reviewed and are negative.     Physical Exam  Laboratory/Imaging   Hemodynamics  Temp (24hrs), Av.7 °C (98 °F), Min:36.4 °C (97.5 °F), Max:37.2 °C (99 °F)   Temperature: 36.8 °C (98.3 °F)  Pulse  Av  Min: 48  Max: 107    Blood Pressure : 155/76      Respiratory      Respiration: 18, Pulse Oximetry: 96 %     Work Of Breathing / Effort: Mild  RUL Breath Sounds: Clear;Diminished, RML Breath Sounds: Clear;Diminished, RLL Breath Sounds: Clear;Diminished, VERITO Breath Sounds: Clear;Diminished, LLL Breath Sounds: Clear;Diminished    Fluids    Intake/Output Summary (Last 24 hours) at 18 1714  Last data filed at 18 1700   Gross per 24 hour   Intake              500 ml   Output               15 ml   Net              485 ml       Nutrition  Orders Placed This Encounter   Procedures   • DIET NPO     Standing Status:    Standing     Number of Occurrences:   1     Order Specific Question:   Restrict to:     Answer:   Strict [1]     Physical Exam   Constitutional: She appears well-developed. No distress.   thin   HENT:   Head: Normocephalic and atraumatic.   Mouth/Throat: Oropharynx is clear and moist.   Eyes: EOM are normal. No scleral icterus.   Neck: Normal range of motion.   Left neck drain and bandage from CEA, clean and dry   Cardiovascular: Normal rate, regular rhythm and intact distal pulses.    No murmur heard.  Pulmonary/Chest: Effort normal. No stridor. No respiratory distress. She has no rales.   Abdominal: Soft. She exhibits no distension. There is no tenderness.   Musculoskeletal: She exhibits no edema or tenderness.   Neurological: She is alert. She displays no tremor.   Speech delayed and slurred but now partially intelligible. Persistent right facial droop.  5/5 strength bilaterally   Skin: Skin is warm and dry. She is not diaphoretic.   Psychiatric: She has a normal mood and affect. Her behavior is normal.   Vitals reviewed.      Recent Labs      07/23/18   0456  07/24/18   0350   WBC  8.0  7.9   RBC  3.04*  2.94*   HEMOGLOBIN  9.6*  9.2*   HEMATOCRIT  29.0*  27.7*   MCV  95.4  94.2   MCH  31.6  31.3   MCHC  33.1*  33.2*   RDW  49.2  47.8   PLATELETCT  186  176   MPV  9.7  10.0     Recent Labs      07/22/18   0344  07/23/18   0456  07/24/18   0350   SODIUM  142  142  143   POTASSIUM  3.3*  4.0  3.4*   CHLORIDE  108  109  110   CO2  20  21  19*   GLUCOSE  83  98  84   BUN  18  24*  19   CREATININE  0.60  0.68  0.54   CALCIUM  8.5  8.5  8.3*                      Assessment/Plan     * Acute CVA (cerebrovascular accident) (HCC)- (present on admission)   Assessment & Plan    s/p alteplase on 7/18. MRI showed a small left MCA territory infarct, no hemorrhagic transformation. Echo normal. Carotid US with > 70% stenosis on the left.   - Neurology evaluated, greatly appreciate   - GI consulted for cortrak placement, placed  today but unable to flush at the moment  - vascular consulted, s/p left CEA 7/22  - start statin and ASA 81mg once cortrak is available for use  Skilled at [place]        Ileus (HCC)   Assessment & Plan    Occurred 7/23  Likely due to insufflation from EGD guided Cortrak per GI  Resolved 7/24  Can resume tube feeds        Carotid artery stenosis, symptomatic, left- (present on admission)   Assessment & Plan    > 70% stenosis on the left. Now s/p left CEA today (7/22).  - vascular consulted, greatly appreciate  - drain to be removed per vascular        Dyslipidemia- (present on admission)   Assessment & Plan    LDL elevated at 166. Fortunately, HLD at 72.  - High intensity statin        Hypertension- (present on admission)   Assessment & Plan    s/p tpa on 7/18. With SBP ~150, goal is <140  - labetalol IV PRN  - Restart home cozaar once cortrak placed        Hypomagnesemia- (present on admission)   Assessment & Plan    Magnesium was low at 1.3, now normalized  - monitor and replete  - start oral supplementation when able        Acute blood loss anemia   Assessment & Plan    Hemoglobin at 11.9 --> 9.6 post op from left CEA. No evidence of active bleeding, site is healing well. Mild bruising.   - monitor clinically and with CBCs        Hypokalemia   Assessment & Plan    K low at 3.3. Likely 2/2 low mag. Improved.  - monitor and replete        Alcohol abuse- (present on admission)   Assessment & Plan    Per family, she drinks 5-6 vodka beverages daily.  - monitor clinically         I spent 45 minutes, reviewing the chart, notes, vitals, labs, imaging, ordering labs, evaluating Maddy Bobo for assessment, enacting the plan above. 50% of the time was spent in counseling Maddy Bobo and coordinating care including review of records, pertinent lab data and studies, as well as discussing diagnostic evaluation and work up, planned therapeutic interventions and future disposition of care. Discussed with RN. Medical  decision making is therefore complex. Time was devoted to counseling and coordinating care including review of records, pertinent lab data and studies, as well as discussing diagnostic evaluation and work up, planned therapeutic interventions and future disposition of care. Where indicated, the assessment and plan reflect discussion of patient with consultants, other healthcare providers, family members, and additional research needed to obtain further information in formulating the plan of care for Maddy Bobo. This time includes no procedures or overlap with other providers.      Quality-Core Measures   Reviewed items::  Medications reviewed and Labs reviewed  [place] catheter::  No [place]  DVT prophylaxis pharmacological::  Enoxaparin (Lovenox)  Assessed for rehabilitation services:  Patient was assess for and/or received rehabilitation services during this hospitalization

## 2018-07-25 NOTE — DISCHARGE PLANNING
Spoke To: [place]   Agency/Facility Name: [facility]  Plan or Request: [facility] cost for transport is $10,342.00.

## 2018-07-25 NOTE — THERAPY
"Speech Language Therapy dysphagia treatment completed.   Functional Status:  Pt seen on this date for dysphagia treat. Pt awake and her daughter present at bedside. Pt had wet voice and intermittent throat clear prior to PO. PO trials of single ice chips and 1/4 teaspoons of purees were presented to pt and wet vocal quality, immediate cough, and throat clearing were noted in ~85% of trials, which is concerning for penetration/aspiration. Pt was cued to cough when wet voice present, however, cough did not appear productive as wet voice persisted. Upon palpation of the larynx, weak laryngeal elevation noted. At this time, recommend pt continue with NPO/cortrak at this time and PO trials with SLP only. Effortful swallow laryngeal exercise taught to pt and she was able to complete with \"fair\" accuracy. Dysphagia education provided to pt and daughter and they verbalized understanding. SLP will continue to follow for dysphagia therapy.    Recommendations:  NPO/cortrak, PO trials with SLP only  Plan of Care: Will benefit from Speech Therapy 5 times per week  Post-Acute Therapy: Discharge to a transitional care facility for continued skilled therapy services.    See \"Rehab Therapy-Acute\" Patient Summary Report for complete documentation.     "

## 2018-07-26 VITALS
OXYGEN SATURATION: 99 % | DIASTOLIC BLOOD PRESSURE: 69 MMHG | TEMPERATURE: 97.5 F | HEIGHT: 64 IN | WEIGHT: 121.69 LBS | BODY MASS INDEX: 20.78 KG/M2 | HEART RATE: 67 BPM | SYSTOLIC BLOOD PRESSURE: 134 MMHG | RESPIRATION RATE: 16 BRPM

## 2018-07-26 LAB
MAGNESIUM SERPL-MCNC: 1 MG/DL (ref 1.5–2.5)
PHOSPHATE SERPL-MCNC: 1.9 MG/DL (ref 2.5–4.5)

## 2018-07-26 PROCEDURE — 99239 HOSP IP/OBS DSCHRG MGMT >30: CPT | Performed by: INTERNAL MEDICINE

## 2018-07-26 PROCEDURE — 700102 HCHG RX REV CODE 250 W/ 637 OVERRIDE(OP): Performed by: INTERNAL MEDICINE

## 2018-07-26 PROCEDURE — 700102 HCHG RX REV CODE 250 W/ 637 OVERRIDE(OP): Performed by: SURGERY

## 2018-07-26 PROCEDURE — 83735 ASSAY OF MAGNESIUM: CPT

## 2018-07-26 PROCEDURE — 84100 ASSAY OF PHOSPHORUS: CPT

## 2018-07-26 PROCEDURE — A9270 NON-COVERED ITEM OR SERVICE: HCPCS | Performed by: HOSPITALIST

## 2018-07-26 PROCEDURE — 700102 HCHG RX REV CODE 250 W/ 637 OVERRIDE(OP): Performed by: HOSPITALIST

## 2018-07-26 PROCEDURE — A9270 NON-COVERED ITEM OR SERVICE: HCPCS | Performed by: INTERNAL MEDICINE

## 2018-07-26 PROCEDURE — A9270 NON-COVERED ITEM OR SERVICE: HCPCS | Performed by: NURSE PRACTITIONER

## 2018-07-26 PROCEDURE — 36415 COLL VENOUS BLD VENIPUNCTURE: CPT

## 2018-07-26 PROCEDURE — 700111 HCHG RX REV CODE 636 W/ 250 OVERRIDE (IP): Performed by: INTERNAL MEDICINE

## 2018-07-26 PROCEDURE — A9270 NON-COVERED ITEM OR SERVICE: HCPCS | Performed by: SURGERY

## 2018-07-26 PROCEDURE — 700102 HCHG RX REV CODE 250 W/ 637 OVERRIDE(OP): Performed by: NURSE PRACTITIONER

## 2018-07-26 RX ADMIN — HYDRALAZINE HYDROCHLORIDE 10 MG: 20 INJECTION INTRAMUSCULAR; INTRAVENOUS at 00:33

## 2018-07-26 RX ADMIN — CLOPIDOGREL 75 MG: 75 TABLET, FILM COATED ORAL at 05:57

## 2018-07-26 RX ADMIN — ACETAMINOPHEN 1000 MG: 500 TABLET, FILM COATED ORAL at 05:57

## 2018-07-26 RX ADMIN — ACETAMINOPHEN 1000 MG: 500 TABLET, FILM COATED ORAL at 00:33

## 2018-07-26 RX ADMIN — ASPIRIN 81 MG: 81 TABLET, CHEWABLE ORAL at 05:57

## 2018-07-26 RX ADMIN — LOSARTAN POTASSIUM 25 MG: 50 TABLET ORAL at 05:58

## 2018-07-26 RX ADMIN — OMEPRAZOLE 40 MG: 20 CAPSULE, DELAYED RELEASE ORAL at 05:57

## 2018-07-26 ASSESSMENT — ENCOUNTER SYMPTOMS
VOMITING: 0
PALPITATIONS: 0
WEAKNESS: 0
FALLS: 0
FOCAL WEAKNESS: 1
CHILLS: 0
HEADACHES: 0
FEVER: 0
SHORTNESS OF BREATH: 0
SPEECH CHANGE: 1
NAUSEA: 0
SPUTUM PRODUCTION: 0
ABDOMINAL PAIN: 0
LOSS OF CONSCIOUSNESS: 0
BLURRED VISION: 0
PSYCHIATRIC NEGATIVE: 1
COUGH: 1

## 2018-07-26 ASSESSMENT — PAIN SCALES - GENERAL: PAINLEVEL_OUTOF10: 0

## 2018-07-26 NOTE — DISCHARGE PLANNING
Discharge Summary  And [facility] [phone].

## 2018-07-26 NOTE — PROGRESS NOTES
[facility] Hospitalist Progress Note    Date of Service: 2018    Chief Complaint  89 y.o. female admitted 2018 with right sided weakness. S/p alteplase and ICU for hourly neuro checks.    Interval Problem Update  . Last night had GI complaints. AXR then showed ileus. Tube feeds were stopped. Daughter at bedside  . Plan for d/c to rehab. Awaiting transport.    Consultants/Specialty  Neurology  Critical Care  GI     Disposition  Rehab vs SNF        Review of Systems   Constitutional: Positive for malaise/fatigue. Negative for chills and fever.   HENT: Negative for congestion.    Eyes: Negative for blurred vision.   Respiratory: Positive for cough (with swallowing). Negative for sputum production and shortness of breath.    Cardiovascular: Negative for chest pain, palpitations and leg swelling.   Gastrointestinal: Negative for abdominal pain, nausea and vomiting.   Genitourinary: Negative for dysuria.   Musculoskeletal: Negative for falls.   Neurological: Positive for speech change and focal weakness (greatly improved). Negative for loss of consciousness, weakness and headaches.   Psychiatric/Behavioral: Negative.    All other systems reviewed and are negative.     Physical Exam  Laboratory/Imaging   Hemodynamics  Temp (24hrs), Av.5 °C (97.7 °F), Min:36.2 °C (97.1 °F), Max:36.9 °C (98.5 °F)   Temperature: 36.4 °C (97.5 °F)  Pulse  Av.4  Min: 48  Max: 107    Blood Pressure : 134/69      Respiratory      Respiration: 16, Pulse Oximetry: 99 %        RUL Breath Sounds: Clear, RML Breath Sounds: Clear, RLL Breath Sounds: Diminished, VERITO Breath Sounds: Clear, LLL Breath Sounds: Diminished    Fluids    Intake/Output Summary (Last 24 hours) at 18 1224  Last data filed at 18 0600   Gross per 24 hour   Intake             1182 ml   Output                0 ml   Net             1182 ml       Nutrition  No orders of the defined types were placed in this encounter.    Physical Exam    Constitutional: She appears well-developed. No distress.   thin   HENT:   Head: Normocephalic and atraumatic.   Mouth/Throat: Oropharynx is clear and moist.   Eyes: EOM are normal. No scleral icterus.   Neck: Normal range of motion.   Left neck drain and bandage from CEA, clean and dry   Cardiovascular: Normal rate, regular rhythm and intact distal pulses.    No murmur heard.  Pulmonary/Chest: Effort normal. No stridor. No respiratory distress. She has no rales.   Abdominal: Soft. She exhibits no distension. There is no tenderness.   Musculoskeletal: She exhibits no edema or tenderness.   Neurological: She is alert. She displays no tremor.   Speech delayed and slurred but now partially intelligible. Persistent right facial droop.  5/5 strength bilaterally   Skin: Skin is warm and dry. She is not diaphoretic.   Psychiatric: She has a normal mood and affect. Her behavior is normal.   Vitals reviewed.      Recent Labs      07/24/18   0350   WBC  7.9   RBC  2.94*   HEMOGLOBIN  9.2*   HEMATOCRIT  27.7*   MCV  94.2   MCH  31.3   MCHC  33.2*   RDW  47.8   PLATELETCT  176   MPV  10.0     Recent Labs      07/24/18   0350   SODIUM  143   POTASSIUM  3.4*   CHLORIDE  110   CO2  19*   GLUCOSE  84   BUN  19   CREATININE  0.54   CALCIUM  8.3*                      Assessment/Plan     * Acute CVA (cerebrovascular accident) (HCC)- (present on admission)   Assessment & Plan    s/p alteplase on 7/18. MRI showed a small left MCA territory infarct, no hemorrhagic transformation. Echo normal. Carotid US with > 70% stenosis on the left.   - Neurology evaluated, greatly appreciate   - GI consulted for cortrak placement, placed today but unable to flush at the moment  - vascular consulted, s/p left CEA 7/22  - start statin and ASA 81mg once cortrak is available for use  Skilled at [place]        [facility]   Assessment & Plan    Occurred 7/23  Likely due to insufflation from EGD guided Cortrak per GI  Resolved 7/24  Can resume tube  feeds        Carotid artery stenosis, symptomatic, left- (present on admission)   Assessment & Plan    > 70% stenosis on the left. Now s/p left CEA today (7/22).  - vascular consulted, greatly appreciate  - drain to be removed per vascular        Dyslipidemia- (present on admission)   Assessment & Plan    LDL elevated at 166. Fortunately, HLD at 72.  - High intensity statin        Hypertension- (present on admission)   Assessment & Plan    s/p tpa on 7/18. With SBP ~150, goal is <140  - labetalol IV PRN  - Restart home cozaar once cortrak placed        Hypomagnesemia- (present on admission)   Assessment & Plan    Magnesium was low at 1.3, now normalized  - monitor and replete  - start oral supplementation when able        Acute blood loss anemia   Assessment & Plan    Hemoglobin at 11.9 --> 9.6 post op from left CEA. No evidence of active bleeding, site is healing well. Mild bruising.   - monitor clinically and with CBCs        Hypokalemia   Assessment & Plan    K low at 3.3. Likely 2/2 low mag. Improved.  - monitor and replete        Alcohol abuse- (present on admission)   Assessment & Plan    Per family, she drinks 5-6 vodka beverages daily.  - monitor clinically               Quality-Core Measures   Reviewed items::  Medications reviewed and Labs reviewed  [place] catheter::  No [place]  DVT prophylaxis pharmacological::  Enoxaparin (Lovenox)  Assessed for rehabilitation services:  Patient was assess for and/or received rehabilitation services during this hospitalization

## 2018-07-26 NOTE — PROGRESS NOTES
Discharge instructions provided to pt. All belongings gathered. PIV removed. Pt left at 0850 with [facility] via [employer]. Report given to RN at [facility].

## 2018-07-26 NOTE — PROGRESS NOTES
Assumed care of pt at 0700. Pt is A&Ox4. Pt has expressive aphasia, but is able to make needs known. Pt is standby assist to bathroom. Cortrak in L nare running Replete Fibersource @50 ml/hr, which is goal. Plan of care discussed. Hourly rounding in place.

## 2018-07-26 NOTE — DISCHARGE PLANNING
Anticipated Discharge Disposition: IRF    Action: Pt to dc to [facility] in [place] this AM.  Transport Packet printed.    (8:05 am) LSW made tc to Devi RODRIGUEZ ([id number]) VENKAT about [facility] time confirmation.    (8:25 am) LSW notified by [employer] that [facility] is here to  pt.    LSW ashley Hernandez sign COBRA.    COBRA packet given to bedside RN.    Barriers to Discharge: NONE    Plan: [facility]

## 2018-07-26 NOTE — PROGRESS NOTES
Pt received 5mg of hydralazine for systolic bp over 140 x 1 during the night. Up frequently to restroom with standby assist. Pt restless most of the night not sleeping much. Hourly rounding in place. Bed lowered, locked and call light within reach.

## 2018-07-26 NOTE — DISCHARGE INSTRUCTIONS
Follow up with [place] primary provider or attending physician at [facility] upon receipt  Follow up with Dr. Cullen for postop visit  Follow up with Dr. Romero for postCortrak placement and depending on Speech therapy eval, other forms of adfvanced feeding if needed.  Follow up with  or neurologist in 1-2 weeks  Instruct Maddy Bobo to return to the ER in the event of worsening symptoms.  the patient on the importance of compliance and the patient has agreed to proceed with all medical recommendations and follow up plan indicated above.    the patient that all medications come with benefits and risks. Risks may include permanent injury or death and these risks can be minimized with close reassessment and monitoring.    Discharge Instructions    Discharged to other by car with relative. Discharged via wheelchair, hospital escort: Yes.  Special equipment needed: Not Applicable    Be sure to schedule a follow-up appointment with your primary care doctor or any specialists as instructed.     Discharge Plan:   Influenza Vaccine Indication: Not indicated: Previously immunized this influenza season and > 8 years of age    I understand that a diet low in cholesterol, fat, and sodium is recommended for good health. Unless I have been given specific instructions below for another diet, I accept this instruction as my diet prescription.     Other diet: Nothing By Mouth - Tube Feed     Special Instructions:     Stroke/CVA/TIA/Hemorrhagic Ischemia Discharge Instructions  You have had a stroke. Your risk factors have been identified as follows:  Age - Over 55  High blood pressure  Carotid Stenosis   High Cholesterol and lipids  Alcohol or drug abuse  It is important that you reduce your risk factors to avoid another stroke in the future. Here are some general guidelines to follow:  · Eat healthy - avoid food high in fat.  · Get regular exercise.  · Maintain a healthy weight.  · Avoid  smoking.  · Avoid alcohol and illegal drug use.  · Take your medications as directed.  For more information regarding risk factors, refer to pages 17-19 in your Stroke Patient Education Guide. Stroke Education Guide was given to patient and family member.    Warning signs of a stroke include (which can also be found on page 3 of your Stroke Patient Education Guide):  · Sudden numbness of weakness of the face, arm or leg (especially on one side of the body).  · Sudden confusion, trouble speaking or understanding.  · Sudden trouble seeing in one or both eyes.  · Sudden trouble walking, dizziness, loss of balance or coordination.  · Sudden severe headache with no known cause.  It is very important to get treatment quickly when a stroke occurs. If you experience any of the above warning signs, call [phone] immediately.     Some patients who have had a stroke will be going home on a blood thinner medication called Warfarin (Coumadin).  This medication requires very close monitoring and follow up.  This follow up can be provided by either your Primary Care Physician or by [facility]'s Outpatient Anticoagulation Service.  The Outpatient Anticoagulation Service is located at the [place] [facility] at [facility]  If you do not know when your follow up appointment is scheduled, call [phone] to verify your appointment time.      · Is patient discharged on Warfarin / Coumadin?   No     Depression / Suicide Risk    As you are discharged from this [facility], it is important to learn how to keep safe from harming yourself.    Recognize the warning signs:  · Abrupt changes in personality, positive or negative- including increase in energy   · Giving away possessions  · Change in eating patterns- significant weight changes-  positive or negative  · Change in sleeping patterns- unable to sleep or sleeping all the time   · Unwillingness or inability to  communicate  · Depression  · Unusual sadness, discouragement and loneliness  · Talk of wanting to die  · Neglect of personal appearance   · Rebelliousness- reckless behavior  · Withdrawal from people/activities they love  · Confusion- inability to concentrate     If you or a loved one observes any of these behaviors or has concerns about self-harm, here's what you can do:  · Talk about it- your feelings and reasons for harming yourself  · Remove any means that you might use to hurt yourself (examples: pills, rope, extension cords, firearm)  · Get professional help from the community (Mental Health, Substance Abuse, psychological counseling)  · Do not be alone:Call your Safe Contact- someone whom you trust who will be there for you.  · Call your local CRISIS HOTLINE 793-4246 or 789-385-0942  · Call your local Children's Mobile Crisis Response Team [place] (799) 547-1340 or www.kubo financiero  · Call the toll free National Suicide Prevention Hotlines   · National Suicide Prevention Lifeline 135-368-BVWB (1113)  · [employer] Line Network 800-SUICIDE (372-2613)      Ischemic Stroke  An ischemic stroke is the sudden death of brain tissue. Blood carries oxygen to all areas of the body. This type of stroke happens when your blood does not flow to your brain like normal. Your brain cannot get the oxygen it needs. This is an emergency. It must be treated right away.  Symptoms of a stroke usually happen all of a sudden. You may notice them when you wake up. They can include:  · Weakness or loss of feeling in your face, arm, or leg. This often happens on one side of the body.  · Trouble walking.  · Trouble moving your arms or legs.  · Loss of balance or coordination.  · Feeling confused.  · Trouble talking or understanding what people are saying.  · Slurred speech.  · Trouble seeing.  · Seeing two of one object (double vision).  · Feeling dizzy.  · Feeling sick to your stomach (nauseous) and throwing up  (vomiting).  · A very bad headache for no reason.  Get help as soon as any of these problems start. This is important. Some treatments work better if they are given right away. These include:  · Aspirin.  · Medicines to control blood pressure.  · A shot (injection) of medicine to break up the blood clot.  · Treatments given in the blood vessel (artery) to take out the clot or break it up.  Other treatments may include:  · Oxygen.  · Fluids given through an IV tube.  · Medicines to thin out your blood.  · Procedures to help your blood flow better.  What increases the risk?  Certain things may make you more likely to have a stroke. Some of these are things that you can change, such as:  · Being very overweight (obesity).  · Smoking.  · Taking birth control pills.  · Not being active.  · Drinking too much alcohol.  · Using drugs.  Other risk factors include:  · High blood pressure.  · High cholesterol.  · Diabetes.  · Heart disease.  · Being , , , or .  · Being over age 60.  · Family history of stroke.  · Having had blood clots, stroke, or warning stroke (transient ischemic attack, TIA) in the past.  · Sickle cell disease.  · Being a woman with a history of high blood pressure in pregnancy (preeclampsia).  · Migraine headache.  · Sleep apnea.  · Having an irregular heartbeat (atrial fibrillation).  · Long-term (chronic) diseases that cause soreness and swelling (inflammation).  · Disorders that affect how your blood clots.  Follow these instructions at home:  Medicines  · Take over-the-counter and prescription medicines only as told by your doctor.  · If you were told to take aspirin or another medicine to thin your blood, take it exactly as told by your doctor.  ¨ Taking too much of the medicine can cause bleeding.  ¨ If you do not take enough, it may not work as well.  · Know the side effects of your medicines. If you are taking a blood thinner, make sure  you:  ¨ Hold pressure over any cuts for longer than usual.  ¨ Tell your dentist and other doctors that you take this medicine.  ¨ Avoid activities that may cause damage or injury to your body.  Eating and drinking  · Follow instructions from your doctor about what you cannot eat or drink.  · Eat healthy foods.  · If you have trouble with swallowing, do these things to avoid choking:  ¨ Take small bites when eating.  ¨ Eat foods that are soft or pureed.  Safety  · Follow instructions from your health care team about physical activity.  · Use a walker or cane as told by your doctor.  · Keep your home safe so you do not fall. This may include:  ¨ Having experts look at your home to make sure it is safe.  ¨ Putting grab bars in the bedroom and bathroom.  ¨ Using raised toilets.  ¨ Putting a seat in the shower.  General instructions  · Do not use any tobacco products.  ¨ Examples of these are cigarettes, chewing tobacco, and e-cigarettes.  ¨ If you need help quitting, ask your doctor.  · Limit how much alcohol you drink. This means no more than 1 drink a day for nonpregnant women and 2 drinks a day for men. One drink equals 12 oz of beer, 5 oz of wine, or 1½ oz of hard liquor.  · If you need help to stop using drugs or alcohol, ask your doctor to refer you to a program or specialist.  · Stay active. Exercise as told by your doctor.  · Keep all follow-up visits as told by your doctor. This is important.  Get help right away if:  · You suddenly:  ¨ Have weakness or loss of feeling in your face, arm, or leg.  ¨ Feel confused.  ¨ Have trouble talking or understanding what people are saying.  ¨ Have trouble seeing.  ¨ Have trouble walking.  ¨ Have trouble moving your arms or legs.  ¨ Feel dizzy.  ¨ Lose your balance or coordination.  ¨ Have a very bad headache and you do not know why.  · You pass out (lose consciousness) or almost pass out.  · You have jerky movements that you cannot control (seizure).  These symptoms may be  an emergency. Do not wait to see if the symptoms will go away. Get medical help right away. Call your local emergency services (911 in the [place] Do not drive yourself to the hospital.   This information is not intended to replace advice given to you by your health care provider. Make sure you discuss any questions you have with your health care provider.  Document Released: 12/06/2012 Document Revised: 05/30/2017 Document Reviewed: 03/15/2017  [employer] Interactive Patient Education © 2017 [employer]    Incentive Spirometer  An incentive spirometer is a tool that measures how well you are filling your lungs with each breath. This tool can help keep your lungs clear and active. Taking long, deep breaths may help reverse or decrease the chance of developing breathing (pulmonary) problems, especially infection, following:  · Surgery of the chest or abdomen.  · Surgery if you have a history of smoking or a lung problem.  · A long period of time when you are unable to move or be active.  If the spirometer includes an indicator to show your best effort, your health care provider or respiratory therapist will help you set a goal. Keep a log of your progress if directed by your health care provider.  What are the risks?  · Breathing too quickly may cause dizziness or cause you to pass out. Take your time so you do not get dizzy or lightheaded.  · If you are in pain, you may need to take or ask for pain medicine before doing incentive spirometry. It is harder to take a deep breath if you are having pain.  How to use your incentive spirometer  6. Sit on the edge of your bed if possible, or sit up as far as you can in bed or on a chair.  7. Hold the incentive spirometer in an upright position.  8. Breathe out normally.  9. Place the mouthpiece in your mouth and seal your lips tightly around it.  10. Breathe in slowly and as deeply as possible, raising the piston or the ball toward the top of the column.  11. Hold your  breath for 3-5 seconds or for as long as possible. Allow the piston or ball to fall to the bottom of the column.  12. Remove the mouthpiece from your mouth and breathe out normally.  13. The spirometer may include an indicator to show your best effort. Use the indicator as a goal to work toward during each repetition.  14. Rest for a few seconds and repeat this at least 10 times, every 1-2 hours when you are awake. Take your time and take a few normal breaths between deep breaths. Breathing too quickly may cause dizziness or cause you to pass out. Take your time so you do not get dizzy or lightheaded.  15. After each set of 10 deep breaths, practice coughing to be sure your lungs are clear. If you had a surgical cut (incision) made during surgery, support your incision when coughing by placing a pillow or rolled-up towel firmly against it.  Once you are able to get out of bed, walk around indoors and cough well. You may stop using the incentive spirometer when instructed by your health care provider.  Contact a health care provider if:  · You are having difficulty using the spirometer.  · You have trouble using the spirometer as often as instructed.  · Your pain medicine is not giving enough relief while using the spirometer.  · You have a fever.  · You develop shortness of breath.  Get help right away if:  · You develop a cough with bloody sputum.  · You develop worsening pain, redness, or discharge at or near the incision site.  This information is not intended to replace advice given to you by your health care provider. Make sure you discuss any questions you have with your health care provider.  Document Released: 04/29/2008 Document Revised: 09/11/2017 Document Reviewed: 07/27/2015  [employer] Interactive Patient Education © 2017 [employer]

## 2018-07-26 NOTE — DISCHARGE PLANNING
Received signed ABN faxed to [facility].  Per Joe at [facility] they have no crew for transport as yet.

## 2018-08-03 NOTE — PROCEDURES
DATE OF SERVICE:  07/23/2018    PROCEDURE:  Flexible esophagogastroduodenoscopy with Cortrak feeding tube   placement.    PREPROCEDURE DIAGNOSIS:  An 87-year-old female with oropharyngeal dysphagia.    POSTPROCEDURE DIAGNOSES:  1.  Successful placement of Cortrak in duodenum.  2.  Schatzki's ring.  3.  Mild erosive esophagitis.  4.  Mild carditis.  5.  Antral erosions.    CONSENT:  Risks, benefits, alternatives had been explained to patient and   family and consent was obtained.    ANESTHESIA:  General anesthesia.    ANESTHESIOLOGIST:  Tim Stuart MD    DESCRIPTION OF PROCEDURE:  Patient was brought to the operating room and   monitoring was in place.  Initially, the patient received propofol sedation.    The endoscope was inserted into the esophagus under direct visualization.  It   was advanced gently and slowly to the distal esophagus where she was noted to   have mild erosive esophagitis, LA class A.  She had a Schatzki's ring with a   lumen diameter of approximately 12 mm.  The endoscope was advanced further   into the stomach and air was insufflated.  The endoscope was advanced to the   antrum where there were few antral erosions.  Retroflexion was performed and   mild inflammation of the gastric cardia was noted.  There were no masses or   ulcerations.  Retroflexion was taken down and the endoscope was advanced   easily through the pylorus into the duodenum.  There were no mucosal   abnormalities in the duodenum.    With keeping the endoscope in the stomach, I then advanced the Cortrak to the   left naris with moderate difficulty.  The Cortrak did have previously attached   suture to it.  This was advanced into the stomach.  The suture of the Cortrak   was grasped with the Hemoclip and I attempted to drag the Cortrak along   through the pylorus.  This was more difficult.  Meanwhile, the   anesthesiologist was concerned about her cardiopulmonary status.  He asked if   we could please remove the endoscope  as he wanted to intubate the patient.    The endoscope was released the Cortrak and the endoscope was removed.  The   patient was then intubated without difficulty by Dr. Stuart.  I reintroduced   the gastroscope into the stomach.  At this time, the suture was grasped with a   biopsy forceps and grasped with the Cortrak at the same time.  I was able to   drag this combination into at least the second portion of the duodenum.  The   biopsy forceps was removed and the Hemoclip device was reintroduced.  The   suture was then hemoclipped to the wall of the duodenum.  Very carefully, the   endoscope was removed as it is well known that the endoscope can pull the   Cortrak back into the stomach.  When in the stomach, I could see the Cortrak   was still passing through the pylorus.  The endoscope was removed.  The   patient tolerated the procedure relatively well.  There were no complications.       ____________________________________     MD MIGUEL LUTZ / BHARAT    DD:  08/03/2018 12:26:39  DT:  08/03/2018 13:34:04    D#:  [id number]  Job#:  [id number]

## 2024-04-24 NOTE — PROGRESS NOTES
Monitor summary: SB-ST , MO .20, QRS .10, QT .40 per strip from monitor room.     PRIOR AUTHORIZATION DENIED    Medication: SEMAGLUTIDE-WEIGHT MANAGEMENT 1 MG/0.5ML SC SOAJ  Insurance Company: "[employer] - Phone [phone] Fax [phone]  Denial Date: 4/23/2024  Denial Reason(s):     Appeal Information:     Patient Notified: No
